# Patient Record
Sex: FEMALE | Race: WHITE | NOT HISPANIC OR LATINO | Employment: UNEMPLOYED | URBAN - METROPOLITAN AREA
[De-identification: names, ages, dates, MRNs, and addresses within clinical notes are randomized per-mention and may not be internally consistent; named-entity substitution may affect disease eponyms.]

---

## 2018-06-19 PROCEDURE — 99284 EMERGENCY DEPT VISIT MOD MDM: CPT

## 2018-06-20 ENCOUNTER — HOSPITAL ENCOUNTER (EMERGENCY)
Facility: MEDICAL CENTER | Age: 43
End: 2018-06-20
Attending: EMERGENCY MEDICINE
Payer: MEDICAID

## 2018-06-20 VITALS
SYSTOLIC BLOOD PRESSURE: 155 MMHG | HEIGHT: 64 IN | OXYGEN SATURATION: 97 % | RESPIRATION RATE: 16 BRPM | HEART RATE: 90 BPM | DIASTOLIC BLOOD PRESSURE: 81 MMHG | BODY MASS INDEX: 47.72 KG/M2 | TEMPERATURE: 98.3 F | WEIGHT: 279.54 LBS

## 2018-06-20 DIAGNOSIS — G47.00 INSOMNIA, UNSPECIFIED TYPE: ICD-10-CM

## 2018-06-20 DIAGNOSIS — F41.9 ANXIETY: ICD-10-CM

## 2018-06-20 RX ORDER — LORAZEPAM 2 MG/1
6 TABLET ORAL
Qty: 9 TAB | Refills: 0 | Status: SHIPPED | OUTPATIENT
Start: 2018-06-20 | End: 2018-06-23

## 2018-06-20 ASSESSMENT — PAIN SCALES - GENERAL: PAINLEVEL_OUTOF10: 0

## 2018-06-20 NOTE — ED TRIAGE NOTES
"Triage Notes    Pt BIB [facility] for anxiety and insomnia, pt was taking 6mg Ativan QHS states it was working on Saturday she was switched to Diazepam 5mg QHS and has not slept since. Pt denies any pain just wants to be able to relax and go to sleep.     .  Chief Complaint   Patient presents with   • Anxiety   • Insomnia     .BP (!) 168/88   Pulse 95   Temp 36.8 °C (98.3 °F)   Resp 18   Ht 1.626 m (5' 4\")   Wt (!) 126.8 kg (279 lb 8.7 oz)   SpO2 96%   BMI 47.98 kg/m²     "

## 2018-06-20 NOTE — ED NOTES
"D/c instructions and rx reviewed with pt. Pt verbalized understanding. Pt in nad. Pt continues to request additional controlled substance rxs prior to d/c. Pt requesting additional \"ativan, codeine, Seroquel, trazodone. Anything to help me sleep\". Pt informed the rx for ativan was the best we could do out of the er. Pt remains anxious, but otherwise in nad at time of d/c.   "

## 2018-06-20 NOTE — ED PROVIDER NOTES
"ED Provider Note    ER PROVIDER NOTE      CHIEF COMPLAINT  Chief Complaint   Patient presents with   • Anxiety   • Insomnia       HPI  Nevin Braxton is a 42 y.o. female who presents to the emergency department complaining of anxiety and difficulty sleeping.  Patient reports he was stable on a dose of lorazepam for many months, recently he was switched to diazepam and has been unable to sleep and more anxious in the last 4 days.  She denies any seizures, palpitations, chest pain nausea vomiting.  States she has an appointment next week but has been unable to get him prior to that    REVIEW OF SYSTEMS  Pertinent positives include anxiety difficulty sleeping. Pertinent negatives include no chest pain. See HPI for details. All other systems reviewed and are negative.    PAST MEDICAL HISTORY   has a past medical history of Psychiatric disorder.    SURGICAL HISTORY  patient denies any surgical history    FAMILY HISTORY  History reviewed. No pertinent family history.    SOCIAL HISTORY  Social History     Social History   • Marital status: Single     Spouse name: N/A   • Number of children: N/A   • Years of education: N/A     Social History Main Topics   • Smoking status: Never Smoker   • Smokeless tobacco: Never Used   • Alcohol use No   • Drug use: No   • Sexual activity: Not on file     Other Topics Concern   • Not on file     Social History Narrative   • No narrative on file      History   Drug Use No       CURRENT MEDICATIONS  Home Medications     Reviewed by Unique Vasquez R.N. (Registered Nurse) on 06/20/18 at 0053  Med List Status: Partial   Medication Last Dose Status        Patient Heber Taking any Medications                       ALLERGIES  No Known Allergies    PHYSICAL EXAM  VITAL SIGNS: BP (!) 168/88   Pulse 95   Temp 36.8 °C (98.3 °F)   Resp 18   Ht 1.626 m (5' 4\")   Wt (!) 126.8 kg (279 lb 8.7 oz)   SpO2 96%   BMI 47.98 kg/m²   Pulse ox interpretation: I interpret this pulse ox as " normal.    Constitutional: Alert in no apparent distress.  HENT: No signs of trauma, Bilateral external ears normal, Nose normal.   Eyes: Pupils are equal and reactive, Conjunctiva normal, Non-icteric.   Neck: Normal range of motion, No tenderness, Supple, No stridor.   Lymphatic: No lymphadenopathy noted.   Cardiovascular: Regular rate and rhythm, no murmurs.   Thorax & Lungs: Normal breath sounds, No respiratory distress, No wheezing, No chest tenderness.   Abdomen: Bowel sounds normal, Soft, No tenderness, No masses, No pulsatile masses. No peritoneal signs.  Skin: Warm, Dry, No erythema, No rash.    Neurologic: Alert , Normal motor function, Normal sensory function, No focal deficits noted.   Psychiatric: Anxious judgment normal, Mood normal.     DIAGNOSTIC STUDIES / PROCEDURES        COURSE & MEDICAL DECISION MAKING  Nursing notes, VS, PMSFHx reviewed in chart.    1:31 AM Patient seen and examined at bedside.   Decision Making:  This is a 42 y.o. female presented with anxiety and difficulty sleeping.  I will give her a short course of her Lorazepam as this was working for her.  I informed her that we are unable to provide long-term prescriptions or refills in the emergency department and she does have a follow-up appointment set.  I also informed her that these medications are not appropriate long-term solution for either anxiety or insomnia she understands this and actually has an appointment with psychiatry to further address this.  However at this time I do think preventing her from withdrawal in helping alleviate her symptoms is indicated    I reviewed prescription monitoring program for patient's narcotic use before prescribing a scheduled drug.The patient will not drink alcohol nor drive with prescribed medications. The patient will return for new or worsening symptoms and is stable at the time of discharge.    The patient is referred to a primary physician for blood pressure management, diabetic  screening, and for all other preventative health concerns.        DISPOSITION:  Patient will be discharged home in stable condition.    FOLLOW UP:  Your Physician  Varies      At your appointment      OUTPATIENT MEDICATIONS:  New Prescriptions    LORAZEPAM (ATIVAN) 2 MG TABLET    Take 3 Tabs by mouth every evening as needed for Anxiety for up to 3 days.         FINAL IMPRESSION  1. Anxiety    2. Insomnia, unspecified type         The note accurately reflects work and decisions made by me.  Brandyn Shabazz  6/20/2018  1:43 AM

## 2018-06-20 NOTE — DISCHARGE INSTRUCTIONS
The medication you are currently taking is not a good long-term solution for insomnia.  It is very important for you to follow-up with her psychiatrist to find an appropriate long-term solution    Insomnia  Insomnia means you have trouble falling or staying asleep. It affects about one person in three at different times and is usually related to stress from work, school, or personal relations. Insomnia is also a sign of depression or anxiety. Other medical problems that cause insomnia include conditions that cause pain, night leg cramps, coughing, shortness of breath, urinary problems, and fevers. Sleep apnea is an abnormal breathing pattern at night that can cause insomnia and loud snoring. Certain medications and excess intake of caffeine drinks (coffee, tea, sharona) can also interfere with normal sleep.  Treatment for insomnia depends on the cause. Besides specific medical treatment, the following measures can help you relax and get better sleep. Get regular exercise every day, at least several hours before bed time. Try to get to bed at the same time every night. Take a hot bath before retiring to help you relax. Do not stay in bed if you are unable to sleep. During the daytime avoid staying in bed to watch television, eat, or read. Reduce unwanted noise and light in your room. Keep your room at a comfortable temperature.  Avoid alcohol as it causes one to sleep less soundly, may cause you to awaken during the night, and can leave you feeling groggy the next day. Using a mild sedative prescribed or suggested by your caregiver may be needed, but the daily use of sleeping pills is not recommended. Anti-depressant medicines can improve sleep in people with depression. Please call your doctor for follow up care to better understand the cause and proper treatment of your insomnia.  Document Released: 01/25/2006 Document Revised: 03/11/2013 Document Reviewed: 12/18/2006  Exit[employer]® Patient Information ©2014 [facility].

## 2019-04-08 ENCOUNTER — TELEPHONE (OUTPATIENT)
Dept: SCHEDULING | Facility: IMAGING CENTER | Age: 44
End: 2019-04-08

## 2019-06-05 ENCOUNTER — OFFICE VISIT (OUTPATIENT)
Dept: INTERNAL MEDICINE | Facility: MEDICAL CENTER | Age: 44
End: 2019-06-05
Payer: MEDICAID

## 2019-06-05 VITALS
BODY MASS INDEX: 53.92 KG/M2 | HEIGHT: 62 IN | DIASTOLIC BLOOD PRESSURE: 80 MMHG | OXYGEN SATURATION: 96 % | HEART RATE: 102 BPM | TEMPERATURE: 96.9 F | SYSTOLIC BLOOD PRESSURE: 110 MMHG | WEIGHT: 293 LBS

## 2019-06-05 DIAGNOSIS — I10 ESSENTIAL HYPERTENSION: ICD-10-CM

## 2019-06-05 DIAGNOSIS — E03.8 OTHER SPECIFIED HYPOTHYROIDISM: ICD-10-CM

## 2019-06-05 DIAGNOSIS — J30.2 SEASONAL ALLERGIC RHINITIS, UNSPECIFIED TRIGGER: ICD-10-CM

## 2019-06-05 DIAGNOSIS — F31.70 BIPOLAR AFFECTIVE DISORDER IN REMISSION (HCC): ICD-10-CM

## 2019-06-05 DIAGNOSIS — Z00.00 HEALTH CARE MAINTENANCE: ICD-10-CM

## 2019-06-05 PROBLEM — J30.9 ALLERGIC RHINITIS: Status: ACTIVE | Noted: 2019-06-05

## 2019-06-05 PROBLEM — E11.9 TYPE 2 DIABETES MELLITUS (HCC): Status: ACTIVE | Noted: 2019-06-05

## 2019-06-05 PROCEDURE — 99204 OFFICE O/P NEW MOD 45 MIN: CPT | Mod: GC | Performed by: INTERNAL MEDICINE

## 2019-06-05 RX ORDER — LORATADINE 10 MG/1
10 TABLET ORAL DAILY
Refills: 1 | COMMUNITY
Start: 2019-04-16 | End: 2019-06-05 | Stop reason: SDUPTHER

## 2019-06-05 RX ORDER — METOPROLOL TARTRATE 50 MG/1
50 TABLET, FILM COATED ORAL DAILY
Qty: 60 TAB | Refills: 2 | Status: SHIPPED | OUTPATIENT
Start: 2019-06-05 | End: 2021-05-13 | Stop reason: SDUPTHER

## 2019-06-05 RX ORDER — ATORVASTATIN CALCIUM 20 MG/1
20 TABLET, FILM COATED ORAL DAILY
Qty: 60 TAB | Refills: 1 | Status: SHIPPED | OUTPATIENT
Start: 2019-06-05 | End: 2021-10-05 | Stop reason: SDUPTHER

## 2019-06-05 RX ORDER — ATORVASTATIN CALCIUM 20 MG/1
20 TABLET, FILM COATED ORAL DAILY
Refills: 2 | COMMUNITY
Start: 2019-04-16 | End: 2019-06-05 | Stop reason: SDUPTHER

## 2019-06-05 RX ORDER — QUETIAPINE FUMARATE 200 MG/1
200 TABLET, FILM COATED ORAL
Refills: 0 | COMMUNITY
Start: 2019-04-16 | End: 2021-03-26

## 2019-06-05 RX ORDER — LISINOPRIL 20 MG/1
20 TABLET ORAL DAILY
Refills: 1 | COMMUNITY
Start: 2019-04-16 | End: 2019-06-05 | Stop reason: SDUPTHER

## 2019-06-05 RX ORDER — LEVOTHYROXINE SODIUM 0.05 MG/1
50 TABLET ORAL
Refills: 3 | COMMUNITY
Start: 2019-04-16 | End: 2019-06-05 | Stop reason: SDUPTHER

## 2019-06-05 RX ORDER — IBUPROFEN 600 MG/1
600 TABLET ORAL EVERY 8 HOURS PRN
COMMUNITY
End: 2019-06-05 | Stop reason: SDUPTHER

## 2019-06-05 RX ORDER — GLYBURIDE-METFORMIN HYDROCHLORIDE 2.5; 5 MG/1; MG/1
1 TABLET ORAL 2 TIMES DAILY WITH MEALS
Refills: 3 | COMMUNITY
Start: 2019-04-16 | End: 2019-06-05 | Stop reason: SDUPTHER

## 2019-06-05 RX ORDER — MONTELUKAST SODIUM 10 MG/1
10 TABLET ORAL DAILY
Refills: 1 | COMMUNITY
Start: 2019-04-16 | End: 2019-06-05 | Stop reason: SDUPTHER

## 2019-06-05 RX ORDER — METHOCARBAMOL 500 MG/1
500 TABLET, FILM COATED ORAL 2 TIMES DAILY
COMMUNITY
End: 2019-06-05 | Stop reason: SDUPTHER

## 2019-06-05 RX ORDER — LANCETS 30 GAUGE
EACH MISCELLANEOUS
COMMUNITY
End: 2019-06-05

## 2019-06-05 RX ORDER — LORATADINE 10 MG/1
10 TABLET ORAL DAILY
Qty: 30 TAB | Refills: 1 | Status: SHIPPED | OUTPATIENT
Start: 2019-06-05 | End: 2021-03-26 | Stop reason: SDUPTHER

## 2019-06-05 RX ORDER — METOPROLOL TARTRATE 50 MG/1
50 TABLET, FILM COATED ORAL DAILY
Refills: 2 | COMMUNITY
Start: 2019-04-16 | End: 2019-06-05 | Stop reason: SDUPTHER

## 2019-06-05 RX ORDER — IBUPROFEN 600 MG/1
600 TABLET ORAL EVERY 8 HOURS PRN
Qty: 60 TAB | Refills: 1 | Status: SHIPPED | OUTPATIENT
Start: 2019-06-05 | End: 2021-03-26

## 2019-06-05 RX ORDER — LANCETS 30 GAUGE
EACH MISCELLANEOUS
Qty: 100 EACH | Refills: 1 | Status: SHIPPED | OUTPATIENT
Start: 2019-06-05 | End: 2021-10-05 | Stop reason: SDUPTHER

## 2019-06-05 RX ORDER — LISINOPRIL 20 MG/1
20 TABLET ORAL DAILY
Qty: 60 TAB | Refills: 1 | Status: SHIPPED | OUTPATIENT
Start: 2019-06-05 | End: 2021-06-11 | Stop reason: SDUPTHER

## 2019-06-05 RX ORDER — GLYBURIDE-METFORMIN HYDROCHLORIDE 2.5; 5 MG/1; MG/1
1 TABLET ORAL 2 TIMES DAILY WITH MEALS
Qty: 120 TAB | Refills: 1 | Status: SHIPPED | OUTPATIENT
Start: 2019-06-05 | End: 2021-03-26

## 2019-06-05 RX ORDER — LEVOTHYROXINE SODIUM 0.05 MG/1
50 TABLET ORAL
Qty: 60 TAB | Refills: 1 | Status: SHIPPED | OUTPATIENT
Start: 2019-06-05 | End: 2021-03-26 | Stop reason: SDUPTHER

## 2019-06-05 RX ORDER — METHOCARBAMOL 500 MG/1
500 TABLET, FILM COATED ORAL
Qty: 30 TAB | Refills: 1 | Status: SHIPPED | OUTPATIENT
Start: 2019-06-05 | End: 2021-03-26

## 2019-06-05 RX ORDER — MONTELUKAST SODIUM 10 MG/1
10 TABLET ORAL DAILY
Qty: 30 TAB | Refills: 1 | Status: SHIPPED | OUTPATIENT
Start: 2019-06-05 | End: 2021-05-05 | Stop reason: SDUPTHER

## 2019-06-05 RX ORDER — HYDROXYZINE PAMOATE 50 MG/1
100 CAPSULE ORAL 2 TIMES DAILY
COMMUNITY
End: 2021-03-26

## 2019-06-05 ASSESSMENT — PATIENT HEALTH QUESTIONNAIRE - PHQ9: CLINICAL INTERPRETATION OF PHQ2 SCORE: 0

## 2019-06-06 NOTE — PROGRESS NOTES
New Patient to Establish    Reason to establish: New patient to establish    CC: Establish care, medication refills    HPI: Patient is a 43-year-old female with past medical history hypothyroidism, type 2 diabetes, allergic rhinitis, hypertension and bipolar disorder who is here today to establish care.  She is requesting refill of all her medications.  She feels well and denies any complaints at this time.    Hypothyroidism: Stable.  Takes levothyroxine 50 mcg daily.    Type 2 diabetes: Takes glyburide-metformin 2.5-500 mg twice daily.  She is compliant with her medications.  Denies any side effects.  Her last A1c from 3/2019 was 7.2.  She denies any hypoglycemic episodes.  Denies symptoms of peripheral neuropathy.  She sees an ophthalmologist every year.    Allergic rhinitis: Takes montelukast 10 mg daily and Claritin daily.  Symptoms are well controlled with these medications.    Hypertension: Takes lisinopril 20 mg daily and metoprolol 50 mg daily.    Bipolar disorder/anxiety: Follows up with psychiatry Dr. De Los Santos.  Is currently on Seroquel 20 mg at bedtime and hydroxyzine 100 mg twice daily.    Patient Active Problem List    Diagnosis Date Noted   • Type 2 diabetes mellitus (HCC) 06/05/2019   • Bipolar affective disorder in remission ([facility]) 06/05/2019   • Other specified hypothyroidism 06/05/2019   • Allergic rhinitis 06/05/2019   • Essential hypertension 06/05/2019       Past Medical History:   Diagnosis Date   • Anxiety    • Diabetes (HCC)    • Hypertension    • Psychiatric disorder        Current Outpatient Prescriptions   Medication Sig Dispense Refill   • QUEtiapine (SEROQUEL) 200 MG Tab Take 200 mg by mouth every bedtime.  0   • hydrOXYzine pamoate (VISTARIL) 50 MG Cap Take 100 mg by mouth 2 Times a Day.     • glucose blood (ONE TOUCH ULTRA TEST) strip 1 Each by Other route 2 Times a Day.     • montelukast (SINGULAIR) 10 MG Tab Take 1 Tab by mouth every day. 30 Tab 1   • levothyroxine (SYNTHROID) 50 MCG Tab  Take 1 Tab by mouth Every morning on an empty stomach. 60 Tab 1   • metoprolol (LOPRESSOR) 50 MG Tab Take 1 Tab by mouth every day. 60 Tab 2   • loratadine (CLARITIN) 10 MG Tab Take 1 Tab by mouth every day. 30 Tab 1   • lisinopril (PRINIVIL) 20 MG Tab Take 1 Tab by mouth every day. 60 Tab 1   • atorvastatin (LIPITOR) 20 MG Tab Take 1 Tab by mouth every day. 60 Tab 1   • glyBURIDE-metFORMIN (GLUCOVANCE) 2.5-500 MG Tab Take 1 Tab by mouth 2 times a day, with meals. 120 Tab 1   • ibuprofen (MOTRIN) 600 MG Tab Take 1 Tab by mouth every 8 hours as needed. 60 Tab 1   • methocarbamol (ROBAXIN) 500 MG Tab Take 1 Tab by mouth 1 time daily as needed. 30 Tab 1   • glucose blood strip 1 Strip by Other route as needed. 100 Strip 1   • Lancets Lancets order: Lancets for insurance preference meter. Sig: use daily and prn ssx high or low sugar. #100 RF x 3 100 Each 1     No current facility-administered medications for this visit.        Allergies as of 06/05/2019   • (No Known Allergies)       Social History     Social History   • Marital status: Single     Spouse name: N/A   • Number of children: N/A   • Years of education: N/A     Occupational History   • Not on file.     Social History Main Topics   • Smoking status: Never Smoker   • Smokeless tobacco: Never Used   • Alcohol use Yes      Comment: Only socially. Very rarely   • Drug use: No   • Sexual activity: Not on file     Other Topics Concern   • Not on file     Social History Narrative   • No narrative on file       Family History   Problem Relation Age of Onset   • Heart Disease Mother    • Alcohol/Drug Mother    • Hypertension Mother    • Hypertension Father    • Psychiatry Sister         Bipolar disorder       History reviewed. No pertinent surgical history.    ROS: As per HPI. Additional pertinent systems as noted below.  Constitutional: Negative for chills and fever.   HENT: Negative for ear pain and sore throat.    Eyes: Negative for discharge and redness.  "  Respiratory: Negative for cough, hemoptysis, wheezing and stridor.    Cardiovascular: Negative for chest pain, palpitations and leg swelling.   Gastrointestinal: Negative for abdominal pain, constipation, diarrhea, heartburn, nausea and vomiting.   Genitourinary: Negative for dysuria, flank pain and hematuria.   Musculoskeletal: Negative for falls and myalgias.   Skin: Negative for itching and rash.   Neurological: Negative for dizziness, seizures, loss of consciousness and headaches.   Endo/Heme/Allergies: Negative for polydipsia. Does not bruise/bleed easily.   Psychiatric/Behavioral: Negative for substance abuse and suicidal ideas.       /80 (BP Location: Left arm, Patient Position: Sitting, BP Cuff Size: Large adult)   Pulse (!) 102   Temp 36.1 °C (96.9 °F) (Temporal)   Ht 1.575 m (5' 2\")   Wt (!) 136.2 kg (300 lb 4 oz)   SpO2 96%   BMI 54.92 kg/m²     Physical Exam  General: Morbidly obese, alert and oriented, No apparent distress.    Eyes: Pupils equal and reactive. No scleral icterus.    Throat: Clear no erythema or exudates noted.    Neck: Supple. No lymphadenopathy noted. Thyroid not enlarged.    Lungs: Clear to auscultation and percussion bilaterally.    Cardiovascular: Regular rate and rhythm. No murmurs, rubs or gallops.    Abdomen: Soft,obese, No rebound or guarding noted.    Extremities: No clubbing, cyanosis, edema.    Skin: Clear. No rash or suspicious skin lesions noted.      Note: I have reviewed all pertinent labs and diagnostic tests associated with this visit with specific comments listed under the assessment and plan below    Assessment and Plan    1. Essential hypertension  - Well controlled  - Continue lisinopril 20 mg daily and metoprolol 50 mg daily  - Advised regular exercise and healthy diet    2. Bipolar affective disorder in remission (HCC)  - Stable  - On quetiapine 200 mg at bedtime  - Follows with psychiatrist Dr. De Los Santos  - Will obtain records    3. Other specified " hypothyroidism  - Labs from 03/2019 show FT4 within normal limits  - Continue levothyroxine 50 mcg daily    4. Type 2 diabetes mellitus  On glyburide-metformin 2.5-500 BID  - A1c 03/2019 7.2  - No symptoms of peripheral neuropathy, monofilament exam next visit  - Next visit with ophthalmologist in July  - Normal album/creat ratio 10/2018  - Advised healthy diet, regular exercise and weight loss  - Continue current medications      5. Health care maintenance  - Pap smear, states that she had one 2 months back, will obtain records  - Mammogram ordered  - MA-SCREENING MAMMO BILAT W/TOMOSYNTHESIS W/CAD; Future        Followup: Return in about 6 months (around 12/5/2019).    Signed by: Danielle Kelley M.D.

## 2019-06-25 ENCOUNTER — PATIENT MESSAGE (OUTPATIENT)
Dept: INTERNAL MEDICINE | Facility: MEDICAL CENTER | Age: 44
End: 2019-06-25

## 2019-06-26 NOTE — TELEPHONE ENCOUNTER
The lancets were already prescribed on 06/05. Do I need to reorder ? Please check with pharmacy if there was a problem with that.

## 2021-03-26 ENCOUNTER — TELEMEDICINE (OUTPATIENT)
Dept: MEDICAL GROUP | Facility: MEDICAL CENTER | Age: 46
End: 2021-03-26
Attending: INTERNAL MEDICINE
Payer: MEDICAID

## 2021-03-26 DIAGNOSIS — J30.2 SEASONAL ALLERGIC RHINITIS, UNSPECIFIED TRIGGER: ICD-10-CM

## 2021-03-26 DIAGNOSIS — E03.9 HYPOTHYROIDISM, UNSPECIFIED TYPE: ICD-10-CM

## 2021-03-26 DIAGNOSIS — F40.00 AGORAPHOBIA: ICD-10-CM

## 2021-03-26 DIAGNOSIS — E11.9 TYPE 2 DIABETES MELLITUS WITHOUT COMPLICATION, WITHOUT LONG-TERM CURRENT USE OF INSULIN (HCC): ICD-10-CM

## 2021-03-26 DIAGNOSIS — F31.70 BIPOLAR AFFECTIVE DISORDER IN REMISSION (HCC): ICD-10-CM

## 2021-03-26 DIAGNOSIS — E78.49 OTHER HYPERLIPIDEMIA: ICD-10-CM

## 2021-03-26 DIAGNOSIS — I10 ESSENTIAL HYPERTENSION: ICD-10-CM

## 2021-03-26 DIAGNOSIS — G47.00 INSOMNIA, UNSPECIFIED TYPE: ICD-10-CM

## 2021-03-26 DIAGNOSIS — F41.0 GENERALIZED ANXIETY DISORDER WITH PANIC ATTACKS: ICD-10-CM

## 2021-03-26 DIAGNOSIS — F41.1 GENERALIZED ANXIETY DISORDER WITH PANIC ATTACKS: ICD-10-CM

## 2021-03-26 PROCEDURE — 99204 OFFICE O/P NEW MOD 45 MIN: CPT | Performed by: INTERNAL MEDICINE

## 2021-03-26 RX ORDER — LEVOTHYROXINE SODIUM 0.05 MG/1
50 TABLET ORAL
Qty: 30 TABLET | Refills: 5 | Status: SHIPPED | OUTPATIENT
Start: 2021-03-26 | End: 2021-08-23

## 2021-03-26 RX ORDER — METFORMIN HYDROCHLORIDE 500 MG/1
2 TABLET, EXTENDED RELEASE ORAL 2 TIMES DAILY
COMMUNITY
Start: 2021-03-22 | End: 2021-10-05 | Stop reason: SDUPTHER

## 2021-03-26 RX ORDER — FLUOXETINE HYDROCHLORIDE 20 MG/1
CAPSULE ORAL
COMMUNITY
Start: 2021-03-22 | End: 2023-10-18

## 2021-03-26 RX ORDER — DIAZEPAM 5 MG/1
TABLET ORAL
COMMUNITY
Start: 2021-02-16 | End: 2021-07-15

## 2021-03-26 RX ORDER — LORATADINE 10 MG/1
10 TABLET ORAL DAILY
Qty: 30 TABLET | Refills: 11 | Status: SHIPPED | OUTPATIENT
Start: 2021-03-26 | End: 2021-10-05 | Stop reason: SDUPTHER

## 2021-03-26 RX ORDER — ZOLPIDEM TARTRATE 12.5 MG/1
TABLET, FILM COATED, EXTENDED RELEASE ORAL
COMMUNITY
Start: 2021-03-16

## 2021-03-26 NOTE — ASSESSMENT & PLAN NOTE
She was diagnosed with hypothyroidism a few years ago.  She continues on levothyroxine 50 mcg daily and reports being stable on this dose.

## 2021-03-26 NOTE — ASSESSMENT & PLAN NOTE
She has a history of severe allergies for which she takes Singulair and Claritin.  She reports good control of her symptoms on this regimen.

## 2021-03-26 NOTE — ASSESSMENT & PLAN NOTE
She is currently working with her psychiatrist on treatment.  States that she was previously on Seroquel and then was switched to Vraylar.  The Vraylar was causing her blood sugars to go up so now they are discussing an alternative.  She has an appointment with her psychiatrist next week.

## 2021-03-26 NOTE — ASSESSMENT & PLAN NOTE
She reports struggling with severe anxiety as well as agoraphobia.  It is difficult for her to leave the house and she typically uses [facility] access to get to her medical appointments.  She prefers to do visits virtually if possible.  She is due for renewal of her RTC paperwork.  She currently sees psychiatry through [facility].  She is taking diazepam 5 mg twice daily as needed, fluoxetine 20 mg daily, and they are working on an alternative to Vraylar to try to help with her bipolar disorder as well.

## 2021-03-26 NOTE — ASSESSMENT & PLAN NOTE
"She reports being diagnosed with diabetes a few years ago.  She cannot remember what her last A1c was and states that it was \"a little bit high\" about 6 months ago.  She has been checking blood sugars in the morning fasting and reports numbers in the 180s to 200s.  She is currently taking extended release Metformin 1000 mg twice daily.  She has also been on glipizide in the past but no other medications for diabetes.  "

## 2021-03-26 NOTE — ASSESSMENT & PLAN NOTE
Currently taking atorvastatin 20 mg daily.  She is not sure what her last labs showed.  She had them through [facility] which we do not have access to.

## 2021-03-26 NOTE — ASSESSMENT & PLAN NOTE
"She reports hypertension for the past several years.  She does have a home blood pressure monitor and reports \"my numbers have not been too bad\" but she is not sure what her readings are.  She is taking lisinopril 20 mg daily.    "

## 2021-03-26 NOTE — PROGRESS NOTES
"Virtual Visit: New Patient   This visit was conducted via Zoom using secure and encrypted videoconferencing technology. The patient was in a private location in the state of [place].    The patient's identity was confirmed and verbal consent was obtained for this virtual visit.    Subjective:     CC: diabetes management, [facility]      Nevin Braxton is a 45 y.o. female presenting to establish care and to discuss the evaluation and management of:      Type 2 diabetes mellitus (HCC)  She reports being diagnosed with diabetes a few years ago.  She cannot remember what her last A1c was and states that it was \"a little bit high\" about 6 months ago.  She has been checking blood sugars in the morning fasting and reports numbers in the 180s to 200s.  She is currently taking extended release Metformin 1000 mg twice daily.  She has also been on glipizide in the past but no other medications for diabetes.    Other specified hypothyroidism  She was diagnosed with hypothyroidism a few years ago.  She continues on levothyroxine 50 mcg daily and reports being stable on this dose.      Essential hypertension  She reports hypertension for the past several years.  She does have a home blood pressure monitor and reports \"my numbers have not been too bad\" but she is not sure what her readings are.  She is taking lisinopril 20 mg daily.      Other hyperlipidemia  Currently taking atorvastatin 20 mg daily.  She is not sure what her last labs showed.  She had them through [facility] which we do not have access to.    Generalized anxiety disorder with panic attacks  She reports struggling with severe anxiety as well as agoraphobia.  It is difficult for her to leave the house and she typically uses RTC access to get to her medical appointments.  She prefers to do visits virtually if possible.  She is due for renewal of her RT paperwork.  She currently sees psychiatry through [facility].  She is taking diazepam 5 mg " twice daily as needed, fluoxetine 20 mg daily, and they are working on an alternative to Vraylar to try to help with her bipolar disorder as well.    Bipolar affective disorder in remission (HCC)  She is currently working with her psychiatrist on treatment.  States that she was previously on Seroquel and then was switched to Vraylar.  The Vraylar was causing her blood sugars to go up so now they are discussing an alternative.  She has an appointment with her psychiatrist next week.    Allergic rhinitis  She has a history of severe allergies for which she takes Singulair and Claritin.  She reports good control of her symptoms on this regimen.         ROS  See HPI  Constitutional: Negative for fever, chills and malaise/fatigue.   HENT: Negative for congestion.    Eyes: Negative for pain.   Respiratory: Negative for cough and shortness of breath.    Cardiovascular: Negative for leg swelling.   Gastrointestinal: Negative for nausea, vomiting, abdominal pain and diarrhea.   Genitourinary: Negative for dysuria and hematuria.   Skin: Negative for rash.   Neurological: Negative for dizziness, focal weakness and headaches.   Endo/Heme/Allergies: Does not bruise/bleed easily.   Psychiatric/Behavioral: Negative for depression.  The patient is not nervous/anxious.      No Known Allergies    Current medicines (including changes today)  Current Outpatient Medications   Medication Sig Dispense Refill   • loratadine (CLARITIN) 10 MG Tab Take 1 tablet by mouth every day. 30 tablet 11   • levothyroxine (SYNTHROID) 50 MCG Tab Take 1 tablet by mouth Every morning on an empty stomach. 30 tablet 5   • montelukast (SINGULAIR) 10 MG Tab Take 1 Tab by mouth every day. 30 Tab 1   • metoprolol (LOPRESSOR) 50 MG Tab Take 1 Tab by mouth every day. 60 Tab 2   • lisinopril (PRINIVIL) 20 MG Tab Take 1 Tab by mouth every day. 60 Tab 1   • metFORMIN ER (GLUCOPHAGE XR) 500 MG TABLET SR 24 HR Take 2 Tablets by mouth 2 Times a Day.     • FLUoxetine  "(PROZAC) 20 MG Cap      • zolpidem (AMBIEN CR) 12.5 MG CR tablet      • diazePAM (VALIUM) 5 MG Tab      • glucose blood strip 1 Strip by Other route every day. 100 Strip 1   • glucose blood (ONE TOUCH ULTRA TEST) strip 1 Each by Other route 2 Times a Day.     • atorvastatin (LIPITOR) 20 MG Tab Take 1 Tab by mouth every day. 60 Tab 1   • Lancets Lancets order: Lancets for insurance preference meter. Sig: use daily and prn ssx high or low sugar. #100 RF x 3 100 Each 1     No current facility-administered medications for this visit.       She  has a past medical history of Anxiety, Bipolar 1 disorder (HCC), Depression, Diabetes (HCC), Hyperlipidemia, Hypertension, and Thyroid disease.  She  has no past surgical history on file.      Family History   Problem Relation Age of Onset   • Heart Disease Mother    • Alcohol/Drug Mother    • Hypertension Mother    • Hyperlipidemia Mother    • Hypertension Father    • Hyperlipidemia Father    • Psychiatric Illness Sister         Bipolar disorder   • Hypertension Brother    • Hyperlipidemia Brother    • Cancer Neg Hx    • Diabetes Neg Hx    • Stroke Neg Hx      Family Status   Relation Name Status   • Mo     • Fa  Alive   • Sis  (Not Specified)   • Bro  (Not Specified)   • Neg Hx  (Not Specified)       Patient Active Problem List    Diagnosis Date Noted   • Generalized anxiety disorder with panic attacks 2021   • Agoraphobia 2021   • Other hyperlipidemia 2021   • Insomnia 2021   • Type 2 diabetes mellitus (HCC) 2019   • Bipolar affective disorder in remission (HCC) 2019   • Other specified hypothyroidism 2019   • Allergic rhinitis 2019   • Essential hypertension 2019          Objective:   Height: 5'2\"  Weight: 300 lb  RR: 12    Physical Exam:  Constitutional: Alert, no distress, well-groomed.  Skin: No rashes in visible areas.  Eye: Round. Conjunctiva clear, lids normal. No icterus.   ENMT: Lips pink without lesions, " good dentition, moist mucous membranes. Phonation normal.  Neck: No masses, no thyromegaly. Moves freely without pain.  Respiratory: Unlabored respiratory effort, no cough or audible wheeze  Psych: Alert and oriented x3, normal affect and mood.       Assessment and Plan:   The following treatment plan was discussed:     1. Seasonal allergic rhinitis, unspecified trigger  Stable, well-controlled with current medications  -Singulair 10 mg daily  - loratadine (CLARITIN) 10 MG Tab; Take 1 tablet by mouth every day.  Dispense: 30 tablet; Refill: 11    2. Hypothyroidism, unspecified type  Stable, well-controlled with current medications.  Will obtain updated labs.  - levothyroxine (SYNTHROID) 50 MCG Tab; Take 1 tablet by mouth Every morning on an empty stomach.  Dispense: 30 tablet; Refill: 5  - TSH WITH REFLEX TO FT4; Future    3. Type 2 diabetes mellitus without complication, without long-term current use of insulin (HCC)  Per patient blood sugars have been a little bit elevated.  We will obtain labs as below and adjust her medications accordingly.  -Continue Metformin extended release 1000 mg twice daily  - HEMOGLOBIN A1C; Future  - MICROALBUMIN CREAT RATIO URINE; Future  - Lipid Profile; Future  - Comp Metabolic Panel; Future    4. Generalized anxiety disorder with panic attacks  Severe per patient and prevents her from using public transportation.  Her RTC paperwork has been completed and will be faxed over today.  She will continue her current medications and management through psychiatry.    5. Agoraphobia  As discussed above    6. Other hyperlipidemia  We will continue current meds and obtain updated labs  -Lipid panel ordered today  -Atorvastatin 20 mg daily    7. Essential hypertension  Controlled per patient.  We will continue current meds  -Lisinopril 20 mg daily    8. Bipolar affective disorder in remission (HCC)  Partially controlled.  She will continue close follow-up with her psychiatrist for medication  management        Follow-up: Return in about 3 months (around 6/26/2021), or if symptoms worsen or fail to improve, for diabetes.

## 2021-05-05 DIAGNOSIS — J30.2 SEASONAL ALLERGIC RHINITIS, UNSPECIFIED TRIGGER: ICD-10-CM

## 2021-05-05 RX ORDER — MONTELUKAST SODIUM 10 MG/1
10 TABLET ORAL DAILY
Qty: 30 TABLET | Refills: 11 | Status: SHIPPED | OUTPATIENT
Start: 2021-05-05 | End: 2021-10-05 | Stop reason: SDUPTHER

## 2021-05-13 DIAGNOSIS — I10 ESSENTIAL HYPERTENSION: ICD-10-CM

## 2021-05-13 RX ORDER — METOPROLOL TARTRATE 50 MG/1
50 TABLET, FILM COATED ORAL DAILY
Qty: 30 TABLET | Refills: 5 | Status: SHIPPED | OUTPATIENT
Start: 2021-05-13 | End: 2021-10-05 | Stop reason: SDUPTHER

## 2021-05-14 PROBLEM — H26.9 CATARACT: Status: ACTIVE | Noted: 2021-05-14

## 2021-05-14 PROBLEM — E11.3399 MODERATE NONPROLIFERATIVE DIABETIC RETINOPATHY (HCC): Status: ACTIVE | Noted: 2021-05-14

## 2021-06-11 DIAGNOSIS — I10 ESSENTIAL HYPERTENSION: ICD-10-CM

## 2021-06-11 RX ORDER — LISINOPRIL 20 MG/1
20 TABLET ORAL DAILY
Qty: 30 TABLET | Refills: 11 | Status: SHIPPED | OUTPATIENT
Start: 2021-06-11 | End: 2021-10-05 | Stop reason: SDUPTHER

## 2021-06-28 ENCOUNTER — HOSPITAL ENCOUNTER (OUTPATIENT)
Dept: LAB | Facility: MEDICAL CENTER | Age: 46
End: 2021-06-28
Attending: INTERNAL MEDICINE
Payer: MEDICAID

## 2021-06-28 DIAGNOSIS — E11.9 TYPE 2 DIABETES MELLITUS WITHOUT COMPLICATION, WITHOUT LONG-TERM CURRENT USE OF INSULIN (HCC): ICD-10-CM

## 2021-06-28 DIAGNOSIS — E03.9 HYPOTHYROIDISM, UNSPECIFIED TYPE: ICD-10-CM

## 2021-06-28 LAB
ALBUMIN SERPL BCP-MCNC: 3.8 G/DL (ref 3.2–4.9)
ALBUMIN/GLOB SERPL: 1.2 G/DL
ALP SERPL-CCNC: 70 U/L (ref 30–99)
ALT SERPL-CCNC: 10 U/L (ref 2–50)
ANION GAP SERPL CALC-SCNC: 13 MMOL/L (ref 7–16)
AST SERPL-CCNC: 19 U/L (ref 12–45)
BILIRUB SERPL-MCNC: 0.5 MG/DL (ref 0.1–1.5)
BUN SERPL-MCNC: 10 MG/DL (ref 8–22)
CALCIUM SERPL-MCNC: 8.6 MG/DL (ref 8.5–10.5)
CHLORIDE SERPL-SCNC: 98 MMOL/L (ref 96–112)
CHOLEST SERPL-MCNC: 150 MG/DL (ref 100–199)
CO2 SERPL-SCNC: 21 MMOL/L (ref 20–33)
CREAT SERPL-MCNC: 0.52 MG/DL (ref 0.5–1.4)
EST. AVERAGE GLUCOSE BLD GHB EST-MCNC: 246 MG/DL
FASTING STATUS PATIENT QL REPORTED: NORMAL
GLOBULIN SER CALC-MCNC: 3.2 G/DL (ref 1.9–3.5)
GLUCOSE SERPL-MCNC: 303 MG/DL (ref 65–99)
HBA1C MFR BLD: 10.2 % (ref 4–5.6)
HDLC SERPL-MCNC: 48 MG/DL
LDLC SERPL CALC-MCNC: 76 MG/DL
POTASSIUM SERPL-SCNC: 5.2 MMOL/L (ref 3.6–5.5)
PROT SERPL-MCNC: 7 G/DL (ref 6–8.2)
SODIUM SERPL-SCNC: 132 MMOL/L (ref 135–145)
TRIGL SERPL-MCNC: 130 MG/DL (ref 0–149)
TSH SERPL DL<=0.005 MIU/L-ACNC: 2.72 UIU/ML (ref 0.38–5.33)

## 2021-06-28 PROCEDURE — 82043 UR ALBUMIN QUANTITATIVE: CPT

## 2021-06-28 PROCEDURE — 84443 ASSAY THYROID STIM HORMONE: CPT

## 2021-06-28 PROCEDURE — 80053 COMPREHEN METABOLIC PANEL: CPT

## 2021-06-28 PROCEDURE — 80061 LIPID PANEL: CPT

## 2021-06-28 PROCEDURE — 83036 HEMOGLOBIN GLYCOSYLATED A1C: CPT

## 2021-06-28 PROCEDURE — 36415 COLL VENOUS BLD VENIPUNCTURE: CPT

## 2021-06-28 PROCEDURE — 82570 ASSAY OF URINE CREATININE: CPT

## 2021-06-29 LAB
CREAT UR-MCNC: 113.55 MG/DL
MICROALBUMIN UR-MCNC: <1.2 MG/DL
MICROALBUMIN/CREAT UR: NORMAL MG/G (ref 0–30)

## 2021-07-15 ENCOUNTER — TELEMEDICINE (OUTPATIENT)
Dept: MEDICAL GROUP | Facility: MEDICAL CENTER | Age: 46
End: 2021-07-15
Attending: INTERNAL MEDICINE
Payer: MEDICAID

## 2021-07-15 VITALS — RESPIRATION RATE: 16 BRPM | WEIGHT: 293 LBS | BODY MASS INDEX: 53.92 KG/M2 | HEIGHT: 62 IN

## 2021-07-15 DIAGNOSIS — K52.1 DIARRHEA DUE TO DRUG: ICD-10-CM

## 2021-07-15 DIAGNOSIS — E11.9 TYPE 2 DIABETES MELLITUS WITHOUT COMPLICATION, WITHOUT LONG-TERM CURRENT USE OF INSULIN (HCC): ICD-10-CM

## 2021-07-15 DIAGNOSIS — J30.2 SEASONAL ALLERGIC RHINITIS, UNSPECIFIED TRIGGER: ICD-10-CM

## 2021-07-15 PROCEDURE — 99214 OFFICE O/P EST MOD 30 MIN: CPT | Mod: CR | Performed by: INTERNAL MEDICINE

## 2021-07-15 RX ORDER — LOPERAMIDE HYDROCHLORIDE 2 MG/1
2 CAPSULE ORAL 4 TIMES DAILY PRN
Qty: 30 CAPSULE | Refills: 3 | Status: SHIPPED | OUTPATIENT
Start: 2021-07-15 | End: 2021-10-05 | Stop reason: SDUPTHER

## 2021-07-15 RX ORDER — SEMAGLUTIDE 1.34 MG/ML
INJECTION, SOLUTION SUBCUTANEOUS
Qty: 1.5 ML | Refills: 3 | Status: SHIPPED | OUTPATIENT
Start: 2021-07-15 | End: 2021-08-16

## 2021-07-15 RX ORDER — BENZONATATE 100 MG/1
100 CAPSULE ORAL 3 TIMES DAILY PRN
Qty: 60 CAPSULE | Refills: 2 | Status: SHIPPED | OUTPATIENT
Start: 2021-07-15 | End: 2021-10-06 | Stop reason: SDUPTHER

## 2021-07-15 RX ORDER — LORAZEPAM 1 MG/1
TABLET ORAL
COMMUNITY
Start: 2021-06-18

## 2021-07-15 NOTE — ASSESSMENT & PLAN NOTE
She presents today for follow-up diabetes.  She has been taking her Metformin  mg 2 tablets twice daily but her last A1c was 10.7.  States she has not been eating a lot of sugary foods or sweets.  She does not drink soda outside of diet Pepsi.  Has tried to minimize carbs.  No significant weight change recently but reports increased stress.

## 2021-07-15 NOTE — ASSESSMENT & PLAN NOTE
Post nasal drip has been causing cough lately.  She is using an old script for tessalon that has been helpful and she is requesting a refill.

## 2021-07-15 NOTE — ASSESSMENT & PLAN NOTE
She gets diarrhea related to her Metformin.  States that she is used to it but she would like a medication to take as needed when it becomes bothersome.

## 2021-07-15 NOTE — PROGRESS NOTES
Virtual Visit: Established Patient   This visit was conducted via Zoom using secure and encrypted videoconferencing technology. The patient was in a private location in the state of [place].    The patient's identity was confirmed and verbal consent was obtained for this virtual visit.    Subjective:   CC:   Chief Complaint   Patient presents with   • Follow-Up diabetes, labs       Nevin Braxton is a 45 y.o. female presenting for evaluation and management of:      Type 2 diabetes mellitus (HCC)  She presents today for follow-up diabetes.  She has been taking her Metformin  mg 2 tablets twice daily but her last A1c was 10.7.  States she has not been eating a lot of sugary foods or sweets.  She does not drink soda outside of diet Pepsi.  Has tried to minimize carbs.  No significant weight change recently but reports increased stress.    Diarrhea due to drug  She gets diarrhea related to her Metformin.  States that she is used to it but she would like a medication to take as needed when it becomes bothersome.    Allergic rhinitis  Post nasal drip has been causing cough lately.  She is using an old script for tessalon that has been helpful and she is requesting a refill.         ROS   Denies any recent fevers or chills. No nausea or vomiting. No chest pains or shortness of breath.     No Known Allergies    Current medicines (including changes today)  Current Outpatient Medications   Medication Sig Dispense Refill   • loperamide (IMODIUM) 2 MG Cap Take 1 capsule by mouth 4 times a day as needed for Diarrhea. 30 capsule 3   • benzonatate (TESSALON) 100 MG Cap Take 1 capsule by mouth 3 times a day as needed for Cough. 60 capsule 2   • Semaglutide,0.25 or 0.5MG/DOS, (OZEMPIC, 0.25 OR 0.5 MG/DOSE,) 2 MG/1.5ML Solution Pen-injector Administer 0.25mg SQ weekly for 4 weeks then increase to 0.5 mg SQ weekly 1.5 mL 3   • LORazepam (ATIVAN) 1 MG Tab      • lisinopril (PRINIVIL) 20 MG Tab Take 1 tablet by mouth every day.  30 tablet 11   • metoprolol tartrate (LOPRESSOR) 50 MG Tab Take 1 tablet by mouth every day. 30 tablet 5   • montelukast (SINGULAIR) 10 MG Tab Take 1 tablet by mouth every day. 30 tablet 11   • metFORMIN ER (GLUCOPHAGE XR) 500 MG TABLET SR 24 HR Take 2 Tablets by mouth 2 Times a Day.     • FLUoxetine (PROZAC) 20 MG Cap      • zolpidem (AMBIEN CR) 12.5 MG CR tablet      • loratadine (CLARITIN) 10 MG Tab Take 1 tablet by mouth every day. 30 tablet 11   • levothyroxine (SYNTHROID) 50 MCG Tab Take 1 tablet by mouth Every morning on an empty stomach. 30 tablet 5   • glucose blood strip 1 Strip by Other route every day. 100 Strip 1   • glucose blood (ONE TOUCH ULTRA TEST) strip 1 Each by Other route 2 Times a Day.     • atorvastatin (LIPITOR) 20 MG Tab Take 1 Tab by mouth every day. 60 Tab 1   • Lancets Lancets order: Lancets for insurance preference meter. Sig: use daily and prn ssx high or low sugar. #100 RF x 3 100 Each 1     No current facility-administered medications for this visit.       Patient Active Problem List    Diagnosis Date Noted   • Diarrhea due to drug 07/15/2021   • Moderate nonproliferative diabetic retinopathy (HCC) 05/14/2021   • Cataract 05/14/2021   • Generalized anxiety disorder with panic attacks 03/26/2021   • Agoraphobia 03/26/2021   • Other hyperlipidemia 03/26/2021   • Insomnia 03/26/2021   • Type 2 diabetes mellitus (HCC) 06/05/2019   • Bipolar affective disorder in remission (HCC) 06/05/2019   • Other specified hypothyroidism 06/05/2019   • Allergic rhinitis 06/05/2019   • Essential hypertension 06/05/2019       Family History   Problem Relation Age of Onset   • Heart Disease Mother    • Alcohol/Drug Mother    • Hypertension Mother    • Hyperlipidemia Mother    • Hypertension Father    • Hyperlipidemia Father    • Psychiatric Illness Sister         Bipolar disorder   • Hypertension Brother    • Hyperlipidemia Brother    • Cancer Neg Hx    • Diabetes Neg Hx    • Stroke Neg Hx        She  has  "a past medical history of Anxiety, Bipolar 1 disorder ([facility]), Depression, Diabetes ([facility]), Hyperlipidemia, Hypertension, and Thyroid disease.  She  has no past surgical history on file.       Objective:   Resp 16   Ht 1.575 m (5' 2\")   Wt (!) 136 kg (300 lb)   BMI 54.87 kg/m²     Physical Exam:  Constitutional: Alert, no distress, well-groomed.  Skin: No rashes in visible areas.  Eye: Round. Conjunctiva clear, lids normal. No icterus.   ENMT: Lips pink without lesions, good dentition, moist mucous membranes. Phonation normal.  Neck: No masses, no thyromegaly. Moves freely without pain.  Respiratory: Unlabored respiratory effort, no cough or audible wheeze  Psych: Alert and oriented x3, tearful, depressed mood      Assessment and Plan:   The following treatment plan was discussed:     1. Type 2 diabetes mellitus without complication, without long-term current use of insulin ([facility])  Discussed side effects, will start on Ozempic as below.  She will continue Metformin.  We will follow-up in office in about 2 months for repeat A1c.  Reviewed diabetic diet.  -Continue metfomin XR 1000 mg BID  - Semaglutide,0.25 or 0.5MG/DOS, (OZEMPIC, 0.25 OR 0.5 MG/DOSE,) 2 MG/1.5ML Solution Pen-injector; Administer 0.25mg SQ weekly for 4 weeks then increase to 0.5 mg SQ weekly  Dispense: 1.5 mL; Refill: 3  -f/u 2 months, repeat A1c    2. Seasonal allergic rhinitis, unspecified trigger  Refilled tessalon to help with cough related to PND  - benzonatate (TESSALON) 100 MG Cap; Take 1 capsule by mouth 3 times a day as needed for Cough.  Dispense: 60 capsule; Refill: 2    3. Diarrhea due to drug  Trial of PRN immodium for metformin related diarrhea  - loperamide (IMODIUM) 2 MG Cap; Take 1 capsule by mouth 4 times a day as needed for Diarrhea.  Dispense: 30 capsule; Refill: 3          Follow-up: Return in about 2 months (around 9/15/2021), or if symptoms worsen or fail to improve, for diabetes.           "

## 2021-08-15 DIAGNOSIS — E11.9 TYPE 2 DIABETES MELLITUS WITHOUT COMPLICATION, WITHOUT LONG-TERM CURRENT USE OF INSULIN (HCC): ICD-10-CM

## 2021-08-16 ENCOUNTER — PATIENT MESSAGE (OUTPATIENT)
Dept: MEDICAL GROUP | Facility: MEDICAL CENTER | Age: 46
End: 2021-08-16

## 2021-08-16 DIAGNOSIS — E11.9 TYPE 2 DIABETES MELLITUS WITHOUT COMPLICATION, WITHOUT LONG-TERM CURRENT USE OF INSULIN (HCC): ICD-10-CM

## 2021-08-17 RX ORDER — SEMAGLUTIDE 1.34 MG/ML
0.5 INJECTION, SOLUTION SUBCUTANEOUS
Qty: 1.5 ML | Refills: 3 | Status: SHIPPED | OUTPATIENT
Start: 2021-08-17 | End: 2021-10-05 | Stop reason: SDUPTHER

## 2021-08-17 RX ORDER — SEMAGLUTIDE 1.34 MG/ML
INJECTION, SOLUTION SUBCUTANEOUS
Qty: 1.5 ML | Refills: 0 | OUTPATIENT
Start: 2021-08-17

## 2021-08-23 DIAGNOSIS — E03.9 HYPOTHYROIDISM, UNSPECIFIED TYPE: ICD-10-CM

## 2021-08-30 RX ORDER — LEVOTHYROXINE SODIUM 0.05 MG/1
TABLET ORAL
Qty: 30 TABLET | Refills: 5 | Status: SHIPPED | OUTPATIENT
Start: 2021-08-30 | End: 2021-09-13 | Stop reason: SDUPTHER

## 2021-09-13 DIAGNOSIS — E03.9 HYPOTHYROIDISM, UNSPECIFIED TYPE: ICD-10-CM

## 2021-09-14 RX ORDER — LEVOTHYROXINE SODIUM 0.05 MG/1
50 TABLET ORAL
Qty: 30 TABLET | Refills: 5 | Status: SHIPPED | OUTPATIENT
Start: 2021-09-14 | End: 2021-10-05 | Stop reason: SDUPTHER

## 2021-09-22 ENCOUNTER — TELEPHONE (OUTPATIENT)
Dept: MEDICAL GROUP | Facility: MEDICAL CENTER | Age: 46
End: 2021-09-22

## 2021-09-23 NOTE — TELEPHONE ENCOUNTER
MEDICATION PRIOR AUTHORIZATION NEEDED:    1. Name of Medication: ozempic    2. Requested By (Name of Pharmacy): [facility]     3. Is insurance on file current? yes    4. What is the name & phone number of the 3rd party payor? [employer]     [employer]ia cover my meds

## 2021-10-01 ENCOUNTER — PATIENT MESSAGE (OUTPATIENT)
Dept: MEDICAL GROUP | Facility: MEDICAL CENTER | Age: 46
End: 2021-10-01

## 2021-10-01 DIAGNOSIS — K52.1 DIARRHEA DUE TO DRUG: ICD-10-CM

## 2021-10-01 DIAGNOSIS — E11.9 TYPE 2 DIABETES MELLITUS WITHOUT COMPLICATION, WITHOUT LONG-TERM CURRENT USE OF INSULIN (HCC): ICD-10-CM

## 2021-10-01 DIAGNOSIS — I10 ESSENTIAL HYPERTENSION: ICD-10-CM

## 2021-10-01 DIAGNOSIS — E03.9 HYPOTHYROIDISM, UNSPECIFIED TYPE: ICD-10-CM

## 2021-10-01 DIAGNOSIS — J30.2 SEASONAL ALLERGIC RHINITIS, UNSPECIFIED TRIGGER: ICD-10-CM

## 2021-10-05 ENCOUNTER — PATIENT MESSAGE (OUTPATIENT)
Dept: MEDICAL GROUP | Facility: MEDICAL CENTER | Age: 46
End: 2021-10-05

## 2021-10-05 DIAGNOSIS — E11.9 TYPE 2 DIABETES MELLITUS WITHOUT COMPLICATION, WITHOUT LONG-TERM CURRENT USE OF INSULIN (HCC): ICD-10-CM

## 2021-10-05 RX ORDER — METFORMIN HYDROCHLORIDE 500 MG/1
1000 TABLET, EXTENDED RELEASE ORAL 2 TIMES DAILY
Qty: 120 TABLET | Refills: 5 | Status: SHIPPED | OUTPATIENT
Start: 2021-10-05 | End: 2021-10-20

## 2021-10-05 RX ORDER — LANCETS 30 GAUGE
EACH MISCELLANEOUS
Qty: 100 EACH | Refills: 3 | Status: SHIPPED | OUTPATIENT
Start: 2021-10-05

## 2021-10-05 RX ORDER — LEVOTHYROXINE SODIUM 0.05 MG/1
50 TABLET ORAL
Qty: 30 TABLET | Refills: 5 | Status: SHIPPED | OUTPATIENT
Start: 2021-10-05 | End: 2022-04-05 | Stop reason: SDUPTHER

## 2021-10-05 RX ORDER — METOPROLOL TARTRATE 50 MG/1
50 TABLET, FILM COATED ORAL DAILY
Qty: 30 TABLET | Refills: 5 | Status: SHIPPED | OUTPATIENT
Start: 2021-10-05 | End: 2022-05-03 | Stop reason: SDUPTHER

## 2021-10-05 RX ORDER — LISINOPRIL 20 MG/1
20 TABLET ORAL DAILY
Qty: 30 TABLET | Refills: 11 | Status: SHIPPED | OUTPATIENT
Start: 2021-10-05 | End: 2022-10-19 | Stop reason: SDUPTHER

## 2021-10-05 RX ORDER — LOPERAMIDE HYDROCHLORIDE 2 MG/1
2 CAPSULE ORAL 4 TIMES DAILY PRN
Qty: 30 CAPSULE | Refills: 3 | Status: SHIPPED | OUTPATIENT
Start: 2021-10-05 | End: 2021-10-20

## 2021-10-05 RX ORDER — SEMAGLUTIDE 1.34 MG/ML
0.5 INJECTION, SOLUTION SUBCUTANEOUS
Qty: 1.5 ML | Refills: 5 | Status: SHIPPED | OUTPATIENT
Start: 2021-10-05 | End: 2021-10-20 | Stop reason: DRUGHIGH

## 2021-10-05 RX ORDER — LORATADINE 10 MG/1
10 TABLET ORAL DAILY
Qty: 30 TABLET | Refills: 11 | Status: SHIPPED | OUTPATIENT
Start: 2021-10-05 | End: 2022-04-05 | Stop reason: SDUPTHER

## 2021-10-05 RX ORDER — MONTELUKAST SODIUM 10 MG/1
10 TABLET ORAL DAILY
Qty: 30 TABLET | Refills: 11 | Status: SHIPPED | OUTPATIENT
Start: 2021-10-05 | End: 2022-10-19 | Stop reason: SDUPTHER

## 2021-10-05 RX ORDER — ATORVASTATIN CALCIUM 20 MG/1
20 TABLET, FILM COATED ORAL DAILY
Qty: 30 TABLET | Refills: 5 | Status: SHIPPED | OUTPATIENT
Start: 2021-10-05 | End: 2022-05-03 | Stop reason: SDUPTHER

## 2021-10-05 NOTE — PATIENT COMMUNICATION
1. Caller Name: [facility]                         Call Back Number:        How would the patient prefer to be contacted with a response: Phone call OK to leave a detailed message       [facility] is asking for an rx for a new meter as they do not have one on file.

## 2021-10-06 DIAGNOSIS — J30.2 SEASONAL ALLERGIC RHINITIS, UNSPECIFIED TRIGGER: ICD-10-CM

## 2021-10-06 RX ORDER — BENZONATATE 100 MG/1
100 CAPSULE ORAL 3 TIMES DAILY PRN
Qty: 60 CAPSULE | Refills: 2 | Status: SHIPPED | OUTPATIENT
Start: 2021-10-06 | End: 2022-02-08 | Stop reason: SDUPTHER

## 2021-10-06 NOTE — TELEPHONE ENCOUNTER
Received request via: Pharmacy    Was the patient seen in the last year in this department? Yes    Does the patient have an active prescription (recently filled or refills available) for medication(s) requested? No     Pt last seen 3/26/21 via telemedicine.     Future Appointments       Provider Department Center    10/20/2021 11:00 AM Blaire Guillory M.D. [facility]

## 2021-10-20 ENCOUNTER — OFFICE VISIT (OUTPATIENT)
Dept: MEDICAL GROUP | Facility: MEDICAL CENTER | Age: 46
End: 2021-10-20
Attending: INTERNAL MEDICINE
Payer: MEDICAID

## 2021-10-20 VITALS
WEIGHT: 293 LBS | DIASTOLIC BLOOD PRESSURE: 90 MMHG | BODY MASS INDEX: 53.92 KG/M2 | HEIGHT: 62 IN | HEART RATE: 96 BPM | SYSTOLIC BLOOD PRESSURE: 140 MMHG | RESPIRATION RATE: 16 BRPM | OXYGEN SATURATION: 94 % | TEMPERATURE: 97.3 F

## 2021-10-20 DIAGNOSIS — E11.9 TYPE 2 DIABETES MELLITUS WITHOUT COMPLICATION, WITHOUT LONG-TERM CURRENT USE OF INSULIN (HCC): ICD-10-CM

## 2021-10-20 DIAGNOSIS — Z23 NEED FOR VACCINATION: ICD-10-CM

## 2021-10-20 DIAGNOSIS — R11.0 NAUSEA: ICD-10-CM

## 2021-10-20 LAB
HBA1C MFR BLD: 8.5 % (ref 0–5.6)
INT CON NEG: NEGATIVE
INT CON POS: POSITIVE

## 2021-10-20 PROCEDURE — 83036 HEMOGLOBIN GLYCOSYLATED A1C: CPT | Performed by: INTERNAL MEDICINE

## 2021-10-20 PROCEDURE — 99213 OFFICE O/P EST LOW 20 MIN: CPT | Mod: 25 | Performed by: INTERNAL MEDICINE

## 2021-10-20 PROCEDURE — 99214 OFFICE O/P EST MOD 30 MIN: CPT | Performed by: INTERNAL MEDICINE

## 2021-10-20 PROCEDURE — 90686 IIV4 VACC NO PRSV 0.5 ML IM: CPT

## 2021-10-20 RX ORDER — SEMAGLUTIDE 1.34 MG/ML
1 INJECTION, SOLUTION SUBCUTANEOUS
Qty: 3 ML | Refills: 5 | Status: SHIPPED | OUTPATIENT
Start: 2021-10-20 | End: 2022-01-09 | Stop reason: SDUPTHER

## 2021-10-20 RX ORDER — ONDANSETRON 4 MG/1
4 TABLET, ORALLY DISINTEGRATING ORAL EVERY 6 HOURS PRN
Qty: 10 TABLET | Refills: 3 | Status: SHIPPED | OUTPATIENT
Start: 2021-10-20 | End: 2022-02-07 | Stop reason: SDUPTHER

## 2021-10-20 RX ORDER — EMPAGLIFLOZIN 25 MG/1
25 TABLET, FILM COATED ORAL DAILY
Qty: 30 TABLET | Refills: 5 | Status: SHIPPED | OUTPATIENT
Start: 2021-10-20 | End: 2022-04-13 | Stop reason: SDUPTHER

## 2021-10-20 RX ORDER — LITHIUM CARBONATE 150 MG/1
CAPSULE ORAL
COMMUNITY
Start: 2021-10-13 | End: 2023-10-18

## 2021-10-20 RX ORDER — METFORMIN HYDROCHLORIDE 500 MG/1
TABLET, EXTENDED RELEASE ORAL
COMMUNITY
Start: 2021-06-15 | End: 2022-02-23

## 2021-10-20 NOTE — ASSESSMENT & PLAN NOTE
She presents for follow-up diabetes.  Her A1c in clinic today is 8.5.  She has been taking the Ozempic 0.5 mg for the past month.  She reports some nausea but she is not sure if this is also from her lithium.  She has been on Metformin both regular release and extended release for over 4 years.  She has always had severe GI side effects and daily diarrhea and cramping as a result.  She would like to try stopping the Metformin as she has needed to take Imodium to combat the side effect and she is uncomfortable a lot of the time.

## 2021-10-21 PROBLEM — K52.1 DIARRHEA DUE TO DRUG: Status: RESOLVED | Noted: 2021-07-15 | Resolved: 2021-10-21

## 2021-10-21 NOTE — PROGRESS NOTES
Subjective:   Nevin Braxton is a 46 y.o. female here today for f/u diabetes    Type 2 diabetes mellitus (HCC)  She presents for follow-up diabetes.  Her A1c in clinic today is 8.5.  She has been taking the Ozempic 0.5 mg for the past month.  She reports some nausea but she is not sure if this is also from her lithium.  She has been on Metformin both regular release and extended release for over 4 years.  She has always had severe GI side effects and daily diarrhea and cramping as a result.  She would like to try stopping the Metformin as she has needed to take Imodium to combat the side effect and she is uncomfortable a lot of the time.        Current medicines (including changes today)  Current Outpatient Medications   Medication Sig Dispense Refill   • lithium carbonate (ESKALITH) 150 MG Cap      • metFORMIN ER (GLUCOPHAGE XR) 500 MG TABLET SR 24 HR METFORMIN HCL  MG XR24H-TAB     • Semaglutide, 1 MG/DOSE, (OZEMPIC, 1 MG/DOSE,) 2 MG/1.5ML Solution Pen-injector Inject 1 mg under the skin every 7 days. 3 mL 5   • ondansetron (ZOFRAN ODT) 4 MG TABLET DISPERSIBLE Take 1 Tablet by mouth every 6 hours as needed for Nausea. 10 Tablet 3   • Empagliflozin (JARDIANCE) 25 MG Tab Take 25 mg by mouth every day. 30 Tablet 5   • Blood Glucose Meter Kit Use to test blood sugar daily 1 Kit 0   • benzonatate (TESSALON) 100 MG Cap Take 1 Capsule by mouth 3 times a day as needed for Cough. 60 Capsule 2   • atorvastatin (LIPITOR) 20 MG Tab Take 1 Tablet by mouth every day. 30 Tablet 5   • glucose blood strip 1 Strip by Other route every day. 100 Strip 3   • Lancets Lancets order: Lancets for insurance preference meter. Sig: use daily and prn ssx high or low sugar. #100 RF x 3 100 Each 3   • levothyroxine (SYNTHROID) 50 MCG Tab Take 1 Tablet by mouth every morning on an empty stomach. ON EMPTY STOMACH 30 Tablet 5   • lisinopril (PRINIVIL) 20 MG Tab Take 1 Tablet by mouth every day. 30 Tablet 11   • loratadine (CLARITIN) 10 MG  Tab Take 1 Tablet by mouth every day. 30 Tablet 11   • metoprolol tartrate (LOPRESSOR) 50 MG Tab Take 1 Tablet by mouth every day. 30 Tablet 5   • montelukast (SINGULAIR) 10 MG Tab Take 1 Tablet by mouth every day. 30 Tablet 11   • LORazepam (ATIVAN) 1 MG Tab      • FLUoxetine (PROZAC) 20 MG Cap      • zolpidem (AMBIEN CR) 12.5 MG CR tablet        No current facility-administered medications for this visit.     She  has a past medical history of Anxiety, Bipolar 1 disorder ([facility]), Depression, Diabetes ([facility]), Hyperlipidemia, Hypertension, and Thyroid disease.    ROS   Denies chest pain, shortness of breath  As above in HPI     Objective:     Vitals:    10/20/21 1049   BP: 140/90   Pulse: 96   Resp: 16   Temp: 36.3 °C (97.3 °F)   SpO2: 94%     Body mass index is 53.58 kg/m².   Physical Exam:  Constitutional: Alert, no distress.  Skin: Warm, dry, good turgor, no rashes in visible areas.  Eye: Equal, round and reactive, conjunctiva clear, lids normal.  Respiratory: Unlabored respiratory effort, lungs clear to auscultation, no wheezes, no ronchi.  Cardiovascular: Regular rate and rhythm, no murmurs appreciated, no lower extremity edema  Psych: Alert and oriented x3, normal affect and mood.      Results and Imaging:   POC A1c in clinic today was 8.5    Assessment and Plan:   The following treatment plan was discussed    1. Type 2 diabetes mellitus without complication, without long-term current use of insulin ([facility])  Discussed need for improvement in her blood sugar control.  She is interested in increasing her Ozempic.  She would like to stop her metformin given her significant side effects with this even with the extended release Metformin.  Because of this, we will start her on Jardiance and have her stop the Metformin with follow-up in 3 months.  - POCT Hemoglobin A1C  - Semaglutide, 1 MG/DOSE, (OZEMPIC, 1 MG/DOSE,) 2 MG/1.5ML Solution Pen-injector; Inject 1 mg under the skin every 7 days.  Dispense: 3 mL; Refill: 5  -  Empagliflozin (JARDIANCE) 25 MG Tab; Take 25 mg by mouth every day.  Dispense: 30 Tablet; Refill: 5    2. Nausea  - ondansetron (ZOFRAN ODT) 4 MG TABLET DISPERSIBLE; Take 1 Tablet by mouth every 6 hours as needed for Nausea.  Dispense: 10 Tablet; Refill: 3    3. Need for vaccination  - INFLUENZA VACCINE QUAD INJ (PF)        Followup: Return in about 3 months (around 1/20/2022), or if symptoms worsen or fail to improve, for diabetes.

## 2022-01-07 DIAGNOSIS — J30.2 SEASONAL ALLERGIC RHINITIS, UNSPECIFIED TRIGGER: ICD-10-CM

## 2022-01-07 DIAGNOSIS — E11.9 TYPE 2 DIABETES MELLITUS WITHOUT COMPLICATION, WITHOUT LONG-TERM CURRENT USE OF INSULIN (HCC): ICD-10-CM

## 2022-01-07 RX ORDER — SEMAGLUTIDE 1.34 MG/ML
1 INJECTION, SOLUTION SUBCUTANEOUS
Qty: 3 ML | Refills: 5 | OUTPATIENT
Start: 2022-01-07

## 2022-01-07 RX ORDER — BENZONATATE 100 MG/1
100 CAPSULE ORAL 3 TIMES DAILY PRN
Qty: 60 CAPSULE | Refills: 2 | Status: CANCELLED | OUTPATIENT
Start: 2022-01-07

## 2022-01-09 DIAGNOSIS — E11.9 TYPE 2 DIABETES MELLITUS WITHOUT COMPLICATION, WITHOUT LONG-TERM CURRENT USE OF INSULIN (HCC): ICD-10-CM

## 2022-01-10 RX ORDER — SEMAGLUTIDE 1.34 MG/ML
1 INJECTION, SOLUTION SUBCUTANEOUS
Qty: 3 ML | Refills: 5 | Status: SHIPPED | OUTPATIENT
Start: 2022-01-10 | End: 2022-06-29 | Stop reason: SDUPTHER

## 2022-02-07 DIAGNOSIS — R11.0 NAUSEA: ICD-10-CM

## 2022-02-07 RX ORDER — ONDANSETRON 4 MG/1
4 TABLET, ORALLY DISINTEGRATING ORAL EVERY 6 HOURS PRN
Qty: 10 TABLET | Refills: 3 | Status: SHIPPED | OUTPATIENT
Start: 2022-02-07 | End: 2022-05-03 | Stop reason: SDUPTHER

## 2022-02-08 DIAGNOSIS — J30.2 SEASONAL ALLERGIC RHINITIS, UNSPECIFIED TRIGGER: ICD-10-CM

## 2022-02-08 NOTE — TELEPHONE ENCOUNTER
Received request via: Pharmacy    Was the patient seen in the last year in this department? Yes    Does the patient have an active prescription (recently filled or refills available) for medication(s) requested? No     Last visit 10/20/21

## 2022-02-09 RX ORDER — BENZONATATE 100 MG/1
100 CAPSULE ORAL 3 TIMES DAILY PRN
Qty: 60 CAPSULE | Refills: 2 | Status: SHIPPED | OUTPATIENT
Start: 2022-02-09 | End: 2022-05-03 | Stop reason: SDUPTHER

## 2022-02-23 ENCOUNTER — TELEMEDICINE (OUTPATIENT)
Dept: MEDICAL GROUP | Facility: MEDICAL CENTER | Age: 47
End: 2022-02-23
Attending: INTERNAL MEDICINE
Payer: MEDICAID

## 2022-02-23 VITALS — BODY MASS INDEX: 53.92 KG/M2 | WEIGHT: 293 LBS | HEIGHT: 62 IN | RESPIRATION RATE: 16 BRPM

## 2022-02-23 DIAGNOSIS — M62.838 MUSCLE SPASM: ICD-10-CM

## 2022-02-23 DIAGNOSIS — E11.9 TYPE 2 DIABETES MELLITUS WITHOUT COMPLICATION, WITHOUT LONG-TERM CURRENT USE OF INSULIN (HCC): ICD-10-CM

## 2022-02-23 DIAGNOSIS — A08.4 VIRAL GASTROENTERITIS: ICD-10-CM

## 2022-02-23 PROCEDURE — 99214 OFFICE O/P EST MOD 30 MIN: CPT | Mod: GT | Performed by: INTERNAL MEDICINE

## 2022-02-23 RX ORDER — CYCLOBENZAPRINE HCL 5 MG
5 TABLET ORAL NIGHTLY PRN
Qty: 30 TABLET | Refills: 0 | Status: SHIPPED | OUTPATIENT
Start: 2022-02-23 | End: 2022-03-24 | Stop reason: SDUPTHER

## 2022-02-23 NOTE — ASSESSMENT & PLAN NOTE
She presents today for follow-up diabetes.  She reports that since increasing her Ozempic at last visit, her blood sugars have been significantly better.  She reports yesterday morning she was at 117 and she has not seen a blood sugar over 200 in the morning since we increased the dose.  She is tolerating it well.  She is feeling much better off the Metformin.

## 2022-02-23 NOTE — ASSESSMENT & PLAN NOTE
"Reports feeling sick with \"a stomach flu\" for the past week.  Complains of diarrhea, nausea, and vomiting.  At this point she feels like she is getting better.  She has been using Zofran and Imodium as needed with some relief.  Today, she has tolerated breakfast and some water.  She has not had any fevers.  "

## 2022-02-23 NOTE — ASSESSMENT & PLAN NOTE
She reports that at night she gets significant muscle spasms in her legs and calves. She does not find that walking alleviates or exacerbates her symptoms.  She denies overwhelming urge to move her legs.  Reports that she has been staying well-hydrated.

## 2022-02-23 NOTE — PROGRESS NOTES
"Virtual Visit: Established Patient   This visit was conducted via Zoom using secure and encrypted videoconferencing technology.   The patient was in their home in the [facility].    The patient's identity was confirmed and verbal consent was obtained for this virtual visit.     Subjective:   CC:   Chief Complaint   Patient presents with   • Follow-Up       Nevin Braxton is a 46 y.o. female presenting for evaluation and management of:    Type 2 diabetes mellitus (HCC)  She presents today for follow-up diabetes.  She reports that since increasing her Ozempic at last visit, her blood sugars have been significantly better.  She reports yesterday morning she was at 117 and she has not seen a blood sugar over 200 in the morning since we increased the dose.  She is tolerating it well.  She is feeling much better off the Metformin.      Muscle spasm  She reports that at night she gets significant muscle spasms in her legs and calves. She does not find that walking alleviates or exacerbates her symptoms.  She denies overwhelming urge to move her legs.  Reports that she has been staying well-hydrated.    Viral gastroenteritis  Reports feeling sick with \"a stomach flu\" for the past week.  Complains of diarrhea, nausea, and vomiting.  At this point she feels like she is getting better.  She has been using Zofran and Imodium as needed with some relief.  Today, she has tolerated breakfast and some water.  She has not had any fevers.         Current medicines (including changes today)  Current Outpatient Medications   Medication Sig Dispense Refill   • magnesium oxide (MAG-OX) 400 MG Tab tablet Take 1 Tablet by mouth at bedtime. 30 Tablet 1   • cyclobenzaprine (FLEXERIL) 5 mg tablet Take 1 Tablet by mouth at bedtime as needed for Muscle Spasms. 30 Tablet 0   • benzonatate (TESSALON) 100 MG Cap Take 1 Capsule by mouth 3 times a day as needed for Cough. 60 Capsule 2   • ondansetron (ZOFRAN ODT) 4 MG TABLET DISPERSIBLE Take 1 " Tablet by mouth every 6 hours as needed for Nausea. 10 Tablet 3   • Semaglutide, 1 MG/DOSE, (OZEMPIC, 1 MG/DOSE,) 2 MG/1.5ML Solution Pen-injector Inject 1 mg under the skin every 7 days. 3 mL 5   • lithium carbonate (ESKALITH) 150 MG Cap      • Empagliflozin (JARDIANCE) 25 MG Tab Take 25 mg by mouth every day. 30 Tablet 5   • Blood Glucose Meter Kit Use to test blood sugar daily 1 Kit 0   • atorvastatin (LIPITOR) 20 MG Tab Take 1 Tablet by mouth every day. 30 Tablet 5   • glucose blood strip 1 Strip by Other route every day. 100 Strip 3   • Lancets Lancets order: Lancets for insurance preference meter. Sig: use daily and prn ssx high or low sugar. #100 RF x 3 100 Each 3   • levothyroxine (SYNTHROID) 50 MCG Tab Take 1 Tablet by mouth every morning on an empty stomach. ON EMPTY STOMACH 30 Tablet 5   • lisinopril (PRINIVIL) 20 MG Tab Take 1 Tablet by mouth every day. 30 Tablet 11   • loratadine (CLARITIN) 10 MG Tab Take 1 Tablet by mouth every day. 30 Tablet 11   • metoprolol tartrate (LOPRESSOR) 50 MG Tab Take 1 Tablet by mouth every day. 30 Tablet 5   • montelukast (SINGULAIR) 10 MG Tab Take 1 Tablet by mouth every day. 30 Tablet 11   • LORazepam (ATIVAN) 1 MG Tab      • FLUoxetine (PROZAC) 20 MG Cap      • zolpidem (AMBIEN CR) 12.5 MG CR tablet        No current facility-administered medications for this visit.       Patient Active Problem List    Diagnosis Date Noted   • Muscle spasm 02/23/2022   • Viral gastroenteritis 02/23/2022   • Moderate nonproliferative diabetic retinopathy (HCC) 05/14/2021   • Cataract 05/14/2021   • Generalized anxiety disorder with panic attacks 03/26/2021   • Agoraphobia 03/26/2021   • Other hyperlipidemia 03/26/2021   • Insomnia 03/26/2021   • Type 2 diabetes mellitus (HCC) 06/05/2019   • Bipolar affective disorder in remission (HCC) 06/05/2019   • Other specified hypothyroidism 06/05/2019   • Allergic rhinitis 06/05/2019   • Essential hypertension 06/05/2019        Objective:   Resp  "16   Ht 1.575 m (5' 2\")   Wt (!) 133 kg (293 lb)   BMI 53.59 kg/m²     Physical Exam:  Constitutional: Alert, no distress, well-groomed.  Skin: No rashes in visible areas.  Eye: Round. Conjunctiva clear, lids normal. No icterus.   ENMT: Lips pink without lesions, good dentition, moist mucous membranes. Phonation normal.  Neck: No masses, no thyromegaly. Moves freely without pain.  Respiratory: Unlabored respiratory effort, no cough or audible wheeze  Psych: Alert and oriented x3, normal affect and mood.     Assessment and Plan:   The following treatment plan was discussed:     1. Type 2 diabetes mellitus without complication, without long-term current use of insulin (HCC)  Improving per patient home reported glucose.  We discussed obtaining a repeat A1c.  She prefers to come into clinic to have this done.  We will continue current meds.  She would like to wait until this note was over before she comes in.  -Continue Ozempic 2 mg weekly, Jardiance 25 mg daily  -Follow-up in 6 weeks for repeat A1c    2. Muscle spasm  Discussed trial of nocturnal magnesium.  If ineffective then she can try low-dose Flexeril.  We will discuss more in exam and is appropriate at next visit.  - magnesium oxide (MAG-OX) 400 MG Tab tablet; Take 1 Tablet by mouth at bedtime.  Dispense: 30 Tablet; Refill: 1  - cyclobenzaprine (FLEXERIL) 5 mg tablet; Take 1 Tablet by mouth at bedtime as needed for Muscle Spasms.  Dispense: 30 Tablet; Refill: 0    3. Viral gastroenteritis  Seems to be resolving.  Symptoms have been noted and patient will let me know if she starts to worsen.        Follow-up: Return in about 6 weeks (around 4/6/2022), or if symptoms worsen or fail to improve, for diabetes.         "

## 2022-03-24 DIAGNOSIS — M62.838 MUSCLE SPASM: ICD-10-CM

## 2022-03-24 RX ORDER — CYCLOBENZAPRINE HCL 5 MG
5 TABLET ORAL NIGHTLY PRN
Qty: 30 TABLET | Refills: 3 | Status: SHIPPED | OUTPATIENT
Start: 2022-03-24 | End: 2022-07-29 | Stop reason: SDUPTHER

## 2022-04-05 DIAGNOSIS — E03.9 HYPOTHYROIDISM, UNSPECIFIED TYPE: ICD-10-CM

## 2022-04-05 DIAGNOSIS — J30.2 SEASONAL ALLERGIC RHINITIS, UNSPECIFIED TRIGGER: ICD-10-CM

## 2022-04-05 RX ORDER — LEVOTHYROXINE SODIUM 0.05 MG/1
50 TABLET ORAL
Qty: 30 TABLET | Refills: 5 | Status: SHIPPED | OUTPATIENT
Start: 2022-04-05 | End: 2022-09-20 | Stop reason: SDUPTHER

## 2022-04-05 RX ORDER — LORATADINE 10 MG/1
10 TABLET ORAL DAILY
Qty: 30 TABLET | Refills: 5 | Status: SHIPPED | OUTPATIENT
Start: 2022-04-05 | End: 2022-10-19 | Stop reason: SDUPTHER

## 2022-04-13 DIAGNOSIS — E11.9 TYPE 2 DIABETES MELLITUS WITHOUT COMPLICATION, WITHOUT LONG-TERM CURRENT USE OF INSULIN (HCC): ICD-10-CM

## 2022-04-14 RX ORDER — EMPAGLIFLOZIN 25 MG/1
25 TABLET, FILM COATED ORAL DAILY
Qty: 30 TABLET | Refills: 11 | Status: SHIPPED | OUTPATIENT
Start: 2022-04-14 | End: 2023-04-05 | Stop reason: SDUPTHER

## 2022-04-14 NOTE — TELEPHONE ENCOUNTER
Received request via: Pharmacy    Was the patient seen in the last year in this department? Yes    Does the patient have an active prescription (recently filled or refills available) for medication(s) requested? No     Last visit 2/23/22

## 2022-05-03 DIAGNOSIS — R11.0 NAUSEA: ICD-10-CM

## 2022-05-03 DIAGNOSIS — E11.9 TYPE 2 DIABETES MELLITUS WITHOUT COMPLICATION, WITHOUT LONG-TERM CURRENT USE OF INSULIN (HCC): ICD-10-CM

## 2022-05-03 DIAGNOSIS — I10 ESSENTIAL HYPERTENSION: ICD-10-CM

## 2022-05-03 DIAGNOSIS — J30.2 SEASONAL ALLERGIC RHINITIS, UNSPECIFIED TRIGGER: ICD-10-CM

## 2022-05-03 RX ORDER — METOPROLOL TARTRATE 50 MG/1
50 TABLET, FILM COATED ORAL DAILY
Qty: 30 TABLET | Refills: 5 | Status: SHIPPED | OUTPATIENT
Start: 2022-05-03 | End: 2022-10-19 | Stop reason: SDUPTHER

## 2022-05-04 RX ORDER — BENZONATATE 100 MG/1
100 CAPSULE ORAL 3 TIMES DAILY PRN
Qty: 60 CAPSULE | Refills: 0 | Status: SHIPPED | OUTPATIENT
Start: 2022-05-04 | End: 2022-08-11 | Stop reason: SDUPTHER

## 2022-05-04 RX ORDER — ATORVASTATIN CALCIUM 20 MG/1
20 TABLET, FILM COATED ORAL DAILY
Qty: 30 TABLET | Refills: 5 | Status: SHIPPED | OUTPATIENT
Start: 2022-05-04 | End: 2022-11-17 | Stop reason: SDUPTHER

## 2022-05-04 RX ORDER — ONDANSETRON 4 MG/1
4 TABLET, ORALLY DISINTEGRATING ORAL EVERY 6 HOURS PRN
Qty: 10 TABLET | Refills: 3 | Status: SHIPPED | OUTPATIENT
Start: 2022-05-04 | End: 2022-08-31 | Stop reason: SDUPTHER

## 2022-05-31 DIAGNOSIS — J30.2 SEASONAL ALLERGIC RHINITIS, UNSPECIFIED TRIGGER: ICD-10-CM

## 2022-06-01 RX ORDER — BENZONATATE 100 MG/1
100 CAPSULE ORAL 3 TIMES DAILY PRN
Qty: 60 CAPSULE | Refills: 0 | OUTPATIENT
Start: 2022-06-01

## 2022-06-29 DIAGNOSIS — E11.9 TYPE 2 DIABETES MELLITUS WITHOUT COMPLICATION, WITHOUT LONG-TERM CURRENT USE OF INSULIN (HCC): ICD-10-CM

## 2022-06-29 RX ORDER — SEMAGLUTIDE 1.34 MG/ML
1 INJECTION, SOLUTION SUBCUTANEOUS
Qty: 3 ML | Refills: 5 | Status: SHIPPED | OUTPATIENT
Start: 2022-06-29 | End: 2022-08-11 | Stop reason: DRUGHIGH

## 2022-07-29 DIAGNOSIS — M62.838 MUSCLE SPASM: ICD-10-CM

## 2022-07-29 RX ORDER — CYCLOBENZAPRINE HCL 5 MG
5 TABLET ORAL NIGHTLY PRN
Qty: 30 TABLET | Refills: 5 | Status: SHIPPED | OUTPATIENT
Start: 2022-07-29 | End: 2023-01-17 | Stop reason: SDUPTHER

## 2022-08-09 RX ORDER — SEMAGLUTIDE 1.34 MG/ML
INJECTION, SOLUTION SUBCUTANEOUS
COMMUNITY
End: 2022-08-11

## 2022-08-09 RX ORDER — MAGNESIUM OXIDE 400 MG/1
TABLET ORAL
COMMUNITY

## 2022-08-11 ENCOUNTER — OFFICE VISIT (OUTPATIENT)
Dept: MEDICAL GROUP | Facility: MEDICAL CENTER | Age: 47
End: 2022-08-11
Attending: INTERNAL MEDICINE
Payer: MEDICAID

## 2022-08-11 VITALS
OXYGEN SATURATION: 93 % | DIASTOLIC BLOOD PRESSURE: 80 MMHG | BODY MASS INDEX: 52.63 KG/M2 | HEART RATE: 104 BPM | WEIGHT: 286 LBS | HEIGHT: 62 IN | RESPIRATION RATE: 16 BRPM | TEMPERATURE: 97.9 F | SYSTOLIC BLOOD PRESSURE: 140 MMHG

## 2022-08-11 DIAGNOSIS — R05.9 COUGH: ICD-10-CM

## 2022-08-11 DIAGNOSIS — Z23 NEED FOR VACCINATION: ICD-10-CM

## 2022-08-11 DIAGNOSIS — I10 ESSENTIAL HYPERTENSION: ICD-10-CM

## 2022-08-11 DIAGNOSIS — Z12.31 ENCOUNTER FOR SCREENING MAMMOGRAM FOR BREAST CANCER: ICD-10-CM

## 2022-08-11 DIAGNOSIS — E11.9 TYPE 2 DIABETES MELLITUS WITHOUT COMPLICATION, WITHOUT LONG-TERM CURRENT USE OF INSULIN (HCC): ICD-10-CM

## 2022-08-11 DIAGNOSIS — E03.8 OTHER SPECIFIED HYPOTHYROIDISM: ICD-10-CM

## 2022-08-11 DIAGNOSIS — Z79.899 LITHIUM USE: ICD-10-CM

## 2022-08-11 PROBLEM — A08.4 VIRAL GASTROENTERITIS: Status: RESOLVED | Noted: 2022-02-23 | Resolved: 2022-08-11

## 2022-08-11 LAB
HBA1C MFR BLD: 10.6 % (ref 0–5.6)
INT CON NEG: NEGATIVE
INT CON POS: POSITIVE

## 2022-08-11 PROCEDURE — 99214 OFFICE O/P EST MOD 30 MIN: CPT | Performed by: INTERNAL MEDICINE

## 2022-08-11 PROCEDURE — 90715 TDAP VACCINE 7 YRS/> IM: CPT

## 2022-08-11 PROCEDURE — 90732 PPSV23 VACC 2 YRS+ SUBQ/IM: CPT

## 2022-08-11 PROCEDURE — 83036 HEMOGLOBIN GLYCOSYLATED A1C: CPT | Performed by: INTERNAL MEDICINE

## 2022-08-11 PROCEDURE — 99213 OFFICE O/P EST LOW 20 MIN: CPT | Performed by: INTERNAL MEDICINE

## 2022-08-11 RX ORDER — BENZONATATE 100 MG/1
100 CAPSULE ORAL 3 TIMES DAILY PRN
Qty: 60 CAPSULE | Refills: 3 | Status: SHIPPED | OUTPATIENT
Start: 2022-08-11 | End: 2022-11-17 | Stop reason: SDUPTHER

## 2022-08-11 RX ORDER — SEMAGLUTIDE 1.34 MG/ML
INJECTION, SOLUTION SUBCUTANEOUS
COMMUNITY
Start: 2022-07-27 | End: 2022-08-11

## 2022-08-11 RX ORDER — GLUCOSAM/CHON-MSM1/C/MANG/BOSW 500-416.6
TABLET ORAL
COMMUNITY
End: 2023-10-18

## 2022-08-11 RX ORDER — SEMAGLUTIDE 2.68 MG/ML
2 INJECTION, SOLUTION SUBCUTANEOUS
Qty: 3 ML | Refills: 5 | Status: SHIPPED | OUTPATIENT
Start: 2022-08-11 | End: 2022-08-19

## 2022-08-11 NOTE — ASSESSMENT & PLAN NOTE
Per patient, her psychiatrist is requesting we check a lithium level with her next labs.  She was started in January.  She denies any adverse side effects but states she has never had a level checked.

## 2022-08-11 NOTE — ASSESSMENT & PLAN NOTE
Blood pressure is a little high in clinic today at 140/80.  She continues on lisinopril 20 mg daily, metoprolol 50 mg daily

## 2022-08-11 NOTE — ASSESSMENT & PLAN NOTE
She presents for follow-up diabetes.  She has been using her Ozempic 1 mg weekly and Jardiance 25 mg daily.  States she has been following a diabetic diet that is low in carbs for the most part.  A1c is up to 10.6 today from 8.3 previously.  She is not sure how to explain this.  Weight has gone down.  Fasting sugars are around 170 most days.  Of note, has not tolerated metformin in the past due to GI upset.

## 2022-08-11 NOTE — ASSESSMENT & PLAN NOTE
"She reports an intermittent cough which is triggered by \"a tickle in my throat\".  She states that if this starts she cannot get herself to stop coughing and she ends up with posttussive emesis.  She uses Tessalon Perles to help with this and reports good control of symptoms with as needed use.  "

## 2022-08-11 NOTE — PROGRESS NOTES
"Subjective:   Nevin Braxton is a 46 y.o. female here today for f/u DM, chronic issues below    Type 2 diabetes mellitus (HCC)  She presents for follow-up diabetes.  She has been using her Ozempic 1 mg weekly and Jardiance 25 mg daily.  States she has been following a diabetic diet that is low in carbs for the most part.  A1c is up to 10.6 today from 8.3 previously.  She is not sure how to explain this.  Weight has gone down.  Fasting sugars are around 170 most days.  Of note, has not tolerated metformin in the past due to GI upset.    Other specified hypothyroidism  She continues on levothyroxine 50 mcg daily.  She is due for updated labs.    Essential hypertension  Blood pressure is a little high in clinic today at 140/80.  She continues on lisinopril 20 mg daily, metoprolol 50 mg daily    Lithium use  Per patient, her psychiatrist is requesting we check a lithium level with her next labs.  She was started in January.  She denies any adverse side effects but states she has never had a level checked.    Cough  She reports an intermittent cough which is triggered by \"a tickle in my throat\".  She states that if this starts she cannot get herself to stop coughing and she ends up with posttussive emesis.  She uses Tessalon Perles to help with this and reports good control of symptoms with as needed use.       Current medicines (including changes today)  Current Outpatient Medications   Medication Sig Dispense Refill    Semaglutide, 2 MG/DOSE, (OZEMPIC, 2 MG/DOSE,) 8 MG/3ML Solution Pen-injector Inject 2 mg under the skin every 7 days. 3 mL 5    benzonatate (TESSALON) 100 MG Cap Take 1 Capsule by mouth 3 times a day as needed for Cough. 60 Capsule 3    glucose blood strip True Metrix Glucose Test Strip      magnesium oxide (MAG-OX) 400 MG Tab tablet magnesium oxide 400 mg (241.3 mg magnesium) tablet      cyclobenzaprine (FLEXERIL) 5 mg tablet Take 1 Tablet by mouth at bedtime as needed for Muscle Spasms. 30 Tablet 5 "    ondansetron (ZOFRAN ODT) 4 MG TABLET DISPERSIBLE Take 1 Tablet by mouth every 6 hours as needed for Nausea. 10 Tablet 3    atorvastatin (LIPITOR) 20 MG Tab Take 1 Tablet by mouth every day. 30 Tablet 5    metoprolol tartrate (LOPRESSOR) 50 MG Tab Take 1 Tablet by mouth every day. 30 Tablet 5    Empagliflozin (JARDIANCE) 25 MG Tab Take 25 mg by mouth every day. 30 Tablet 11    levothyroxine (SYNTHROID) 50 MCG Tab Take 1 Tablet by mouth every morning on an empty stomach. ON EMPTY STOMACH 30 Tablet 5    loratadine (CLARITIN) 10 MG Tab Take 1 Tablet by mouth every day. 30 Tablet 5    magnesium oxide (MAG-OX) 400 MG Tab tablet Take 1 Tablet by mouth at bedtime. 30 Tablet 1    lithium carbonate (ESKALITH) 150 MG Cap       Blood Glucose Meter Kit Use to test blood sugar daily 1 Kit 0    glucose blood strip 1 Strip by Other route every day. 100 Strip 3    Lancets Lancets order: Lancets for insurance preference meter. Sig: use daily and prn ssx high or low sugar. #100 RF x 3 100 Each 3    lisinopril (PRINIVIL) 20 MG Tab Take 1 Tablet by mouth every day. 30 Tablet 11    montelukast (SINGULAIR) 10 MG Tab Take 1 Tablet by mouth every day. 30 Tablet 11    LORazepam (ATIVAN) 1 MG Tab       FLUoxetine (PROZAC) 20 MG Cap       zolpidem (AMBIEN CR) 12.5 MG CR tablet       TRUEplus Lancets 33G Misc TRUEplus Lancets 33 gauge       No current facility-administered medications for this visit.     She  has a past medical history of Anxiety, Bipolar 1 disorder (HCC), Depression, Diabetes (HCC), Hyperlipidemia, Hypertension, and Thyroid disease.         Objective:     Vitals:    08/11/22 1039   BP: (!) 140/80   Pulse: (!) 104   Resp: 16   Temp: 36.6 °C (97.9 °F)   SpO2: 93%     Body mass index is 52.3 kg/m².   Physical Exam:  Constitutional: Alert, no distress.  Skin: Warm, dry, good turgor, no rashes in visible areas.  Eye: Equal, round and reactive, conjunctiva clear, lids normal.  Monofilament testing with a 10 gram force: sensation  intact: intact bilaterally  Visual Inspection: Feet without maceration, ulcers, fissures.  Pedal pulses: intact bilaterally      Results and Imaging:   Lab Results   Component Value Date/Time    HBA1C 10.6 (A) 08/11/2022 10:52 AM          Assessment and Plan:   The following treatment plan was discussed    1. Type 2 diabetes mellitus without complication, without long-term current use of insulin (HCC)  Uncontrolled.  We will increase her Ozempic dose.  We also discussed briefly that we might need to start on insulin but she is reluctant to do this.  -Continue Jardiance 25 mg daily  - POCT Hemoglobin A1C  - Semaglutide, 2 MG/DOSE, (OZEMPIC, 2 MG/DOSE,) 8 MG/3ML Solution Pen-injector; Inject 2 mg under the skin every 7 days.  Dispense: 3 mL; Refill: 5  - Comp Metabolic Panel; Future  - Lipid Profile; Future  - Microalbumin Creat Ratio Urine - Lab Collect; Future  - Diabetic Monofilament LE Exam  -Follow-up in 3 months for repeat A1c    2. Lithium use  We will check a lithium level as requested by her psychiatrist.  Psychiatry to continue dose management (Honey at Dr. Mcghee's's office)  - LITHIUM; Future    3. Need for vaccination  - PneumoVax PPV23 =>3yo  - Tdap =>8yo IM    4. Other specified hypothyroidism  Stable, continue current medication  -Levothyroxine 50 mcg daily  - TSH WITH REFLEX TO FT4; Future    5. Encounter for screening mammogram for breast cancer  - MA-SCREENING MAMMO BILAT W/TOMOSYNTHESIS W/CAD; Future    6. Essential hypertension  Blood pressure is slightly elevated however patient suffers from severe anxiety.  We will continue current medications and monitor.    7. Cough  - benzonatate (TESSALON) 100 MG Cap; Take 1 Capsule by mouth 3 times a day as needed for Cough.  Dispense: 60 Capsule; Refill: 3        Followup: Return in about 3 months (around 11/11/2022), or if symptoms worsen or fail to improve, for diabetes.

## 2022-08-19 ENCOUNTER — PATIENT MESSAGE (OUTPATIENT)
Dept: MEDICAL GROUP | Facility: MEDICAL CENTER | Age: 47
End: 2022-08-19
Payer: MEDICAID

## 2022-08-19 DIAGNOSIS — E11.65 TYPE 2 DIABETES MELLITUS WITH HYPERGLYCEMIA, WITHOUT LONG-TERM CURRENT USE OF INSULIN (HCC): ICD-10-CM

## 2022-08-19 RX ORDER — DULAGLUTIDE 3 MG/.5ML
0.5 INJECTION, SOLUTION SUBCUTANEOUS
Qty: 2 ML | Refills: 3 | Status: SHIPPED | OUTPATIENT
Start: 2022-08-19 | End: 2022-12-27 | Stop reason: SDUPTHER

## 2022-08-22 NOTE — TELEPHONE ENCOUNTER
DOCUMENTATION OF PAR STATUS:    1. Name of Medication & Dose: Trulicity 3mg    2. Name of Prescription Coverage Company & phone #: [facility] pharmacy    3. Date Prior Auth Submitted: 8/22/2022    4. What information was given to obtain insurance decision? ICD 10 code    5. Prior Auth Status? Pending    6. Patient Notified: N\A

## 2022-08-31 DIAGNOSIS — R11.0 NAUSEA: ICD-10-CM

## 2022-08-31 RX ORDER — ONDANSETRON 4 MG/1
4 TABLET, ORALLY DISINTEGRATING ORAL EVERY 6 HOURS PRN
Qty: 10 TABLET | Refills: 5 | Status: SHIPPED | OUTPATIENT
Start: 2022-08-31 | End: 2023-02-07 | Stop reason: SDUPTHER

## 2022-09-20 DIAGNOSIS — E03.9 HYPOTHYROIDISM, UNSPECIFIED TYPE: ICD-10-CM

## 2022-09-21 RX ORDER — LEVOTHYROXINE SODIUM 0.05 MG/1
50 TABLET ORAL
Qty: 30 TABLET | Refills: 5 | Status: SHIPPED | OUTPATIENT
Start: 2022-09-21 | End: 2023-03-06 | Stop reason: SDUPTHER

## 2022-10-19 DIAGNOSIS — J30.2 SEASONAL ALLERGIC RHINITIS, UNSPECIFIED TRIGGER: ICD-10-CM

## 2022-10-19 DIAGNOSIS — I10 ESSENTIAL HYPERTENSION: ICD-10-CM

## 2022-10-19 RX ORDER — LORATADINE 10 MG/1
10 TABLET ORAL DAILY
Qty: 30 TABLET | Refills: 5 | Status: SHIPPED | OUTPATIENT
Start: 2022-10-19 | End: 2023-04-05 | Stop reason: SDUPTHER

## 2022-10-19 RX ORDER — MONTELUKAST SODIUM 10 MG/1
10 TABLET ORAL DAILY
Qty: 30 TABLET | Refills: 11 | Status: SHIPPED | OUTPATIENT
Start: 2022-10-19 | End: 2023-10-18 | Stop reason: SDUPTHER

## 2022-10-19 RX ORDER — METOPROLOL TARTRATE 50 MG/1
50 TABLET, FILM COATED ORAL DAILY
Qty: 30 TABLET | Refills: 5 | Status: SHIPPED | OUTPATIENT
Start: 2022-10-19 | End: 2023-04-05 | Stop reason: SDUPTHER

## 2022-11-17 DIAGNOSIS — R05.9 COUGH: ICD-10-CM

## 2022-11-17 DIAGNOSIS — E11.9 TYPE 2 DIABETES MELLITUS WITHOUT COMPLICATION, WITHOUT LONG-TERM CURRENT USE OF INSULIN (HCC): ICD-10-CM

## 2022-11-17 RX ORDER — BENZONATATE 100 MG/1
100 CAPSULE ORAL 3 TIMES DAILY PRN
Qty: 60 CAPSULE | Refills: 3 | Status: SHIPPED | OUTPATIENT
Start: 2022-11-17 | End: 2023-01-17 | Stop reason: SDUPTHER

## 2022-11-17 RX ORDER — ATORVASTATIN CALCIUM 20 MG/1
20 TABLET, FILM COATED ORAL DAILY
Qty: 30 TABLET | Refills: 5 | Status: SHIPPED | OUTPATIENT
Start: 2022-11-17 | End: 2023-06-05 | Stop reason: SDUPTHER

## 2022-11-17 NOTE — TELEPHONE ENCOUNTER
Received request via: Pharmacy    Was the patient seen in the last year in this department? Yes    Does the patient have an active prescription (recently filled or refills available) for medication(s) requested? No    Does the patient have longterm Plus and need 100 day supply (blood pressure, diabetes and cholesterol meds only)? Patient does not have SCP

## 2022-12-27 DIAGNOSIS — E11.65 TYPE 2 DIABETES MELLITUS WITH HYPERGLYCEMIA, WITHOUT LONG-TERM CURRENT USE OF INSULIN (HCC): ICD-10-CM

## 2022-12-27 RX ORDER — DULAGLUTIDE 3 MG/.5ML
0.5 INJECTION, SOLUTION SUBCUTANEOUS
Qty: 2 ML | Refills: 5 | Status: SHIPPED | OUTPATIENT
Start: 2022-12-27 | End: 2022-12-28

## 2022-12-27 NOTE — TELEPHONE ENCOUNTER
Received request via: Patient    Was the patient seen in the last year in this department? Yes    Does the patient have an active prescription (recently filled or refills available) for medication(s) requested? No  Future Appointments         Provider Department [place]    1/5/2023 10:00 AM [facility]     1/20/2023 11:00 AM (Arrive by 10:45 AM) Blaire Guillory M.D. [facility] [place]             PT REQUEST REFILL TO [facility] [address] [facility] TO SHOW CHANGE

## 2022-12-28 ENCOUNTER — TELEPHONE (OUTPATIENT)
Dept: MEDICAL GROUP | Facility: MEDICAL CENTER | Age: 47
End: 2022-12-28
Payer: MEDICAID

## 2022-12-28 DIAGNOSIS — E11.9 TYPE 2 DIABETES MELLITUS WITHOUT COMPLICATION, WITHOUT LONG-TERM CURRENT USE OF INSULIN (HCC): ICD-10-CM

## 2022-12-28 RX ORDER — SEMAGLUTIDE 2.68 MG/ML
2 INJECTION, SOLUTION SUBCUTANEOUS
Qty: 3 ML | Refills: 3 | Status: SHIPPED | OUTPATIENT
Start: 2022-12-28 | End: 2023-01-04 | Stop reason: SDUPTHER

## 2022-12-28 NOTE — TELEPHONE ENCOUNTER
VOICEMAIL  1. Caller Name: Nevin Braxton    Call Back Number: [phone] (home) [phone] (work)      2. Message: Patient mention the pharmacy does not have trulicity on stock, but they do have ozempic on stock patient mention if is possible you can Rx her that instead since she was taking that back then which was 2 mg.    3. Patient approves office to leave a detailed voicemail/MyChart message: yes

## 2023-01-04 DIAGNOSIS — E11.9 TYPE 2 DIABETES MELLITUS WITHOUT COMPLICATION, WITHOUT LONG-TERM CURRENT USE OF INSULIN (HCC): ICD-10-CM

## 2023-01-04 RX ORDER — SEMAGLUTIDE 2.68 MG/ML
2 INJECTION, SOLUTION SUBCUTANEOUS
Qty: 3 ML | Refills: 3 | Status: SHIPPED | OUTPATIENT
Start: 2023-01-04 | End: 2023-07-10 | Stop reason: SDUPTHER

## 2023-01-04 NOTE — TELEPHONE ENCOUNTER
Received request via: Patient    Was the patient seen in the last year in this department? Yes    Does the patient have an active prescription (recently filled or refills available) for medication(s) requested? No    Pt has requested to have medication sent to [facility], [facility]   [facility]

## 2023-01-17 DIAGNOSIS — M62.838 MUSCLE SPASM: ICD-10-CM

## 2023-01-17 DIAGNOSIS — R05.9 COUGH: ICD-10-CM

## 2023-01-18 RX ORDER — BENZONATATE 100 MG/1
100 CAPSULE ORAL 3 TIMES DAILY PRN
Qty: 60 CAPSULE | Refills: 5 | Status: SHIPPED | OUTPATIENT
Start: 2023-01-18 | End: 2023-10-18 | Stop reason: SDUPTHER

## 2023-01-18 RX ORDER — CYCLOBENZAPRINE HCL 5 MG
5 TABLET ORAL NIGHTLY PRN
Qty: 30 TABLET | Refills: 5 | Status: SHIPPED | OUTPATIENT
Start: 2023-01-18 | End: 2023-07-21 | Stop reason: SDUPTHER

## 2023-01-18 NOTE — TELEPHONE ENCOUNTER
Received request via: Pharmacy    Was the patient seen in the last year in this department? Yes    Does the patient have an active prescription (recently filled or refills available) for medication(s) requested? No    Does the patient have intermediate Plus and need 100 day supply (blood pressure, diabetes and cholesterol meds only)? Patient does not have SCP    Future Appointments         Provider Department [place]    1/27/2023 10:00 AM [facility]     1/31/2023 10:20 AM (Arrive by 10:05 AM) Blaire Guillory M.D. [facility]

## 2023-01-18 NOTE — TELEPHONE ENCOUNTER
Received request via: Pharmacy    Was the patient seen in the last year in this department? Yes    Does the patient have an active prescription (recently filled or refills available) for medication(s) requested? No    Does the patient have [facility] Plus and need 100 day supply (blood pressure, diabetes and cholesterol meds only)? Patient does not have SCP    Future Appointments         Provider Department [place]    1/27/2023 10:00 AM [facility]     1/31/2023 10:20 AM (Arrive by 10:05 AM) Blaire Guillory M.D. [facility]

## 2023-02-07 DIAGNOSIS — R11.0 NAUSEA: ICD-10-CM

## 2023-02-07 RX ORDER — ONDANSETRON 4 MG/1
4 TABLET, ORALLY DISINTEGRATING ORAL EVERY 6 HOURS PRN
Qty: 10 TABLET | Refills: 5 | Status: SHIPPED | OUTPATIENT
Start: 2023-02-07 | End: 2023-10-09 | Stop reason: SDUPTHER

## 2023-02-07 NOTE — TELEPHONE ENCOUNTER
Received request via: Pharmacy    Was the patient seen in the last year in this department? Yes    Does the patient have an active prescription (recently filled or refills available) for medication(s) requested? No    Does the patient have alf Plus and need 100 day supply (blood pressure, diabetes and cholesterol meds only)? Patient does not have SCP  Future Appointments         Provider Department [place]    2/28/2023 10:00 AM [facility]     3/15/2023 11:00 AM (Arrive by 10:45 AM) Blaire Guillory M.D. [facility]

## 2023-02-14 ENCOUNTER — TELEPHONE (OUTPATIENT)
Dept: MEDICAL GROUP | Facility: MEDICAL CENTER | Age: 48
End: 2023-02-14
Payer: MEDICAID

## 2023-02-15 NOTE — TELEPHONE ENCOUNTER
MEDICATION PRIOR AUTHORIZATION NEEDED:    1. Name of Medication: ozempic    2. Requested By (Name of [facility]): [facility]      3. Is insurance on file current? yes    4. What is the name & phone number of the 3rd party payor? Haider ENRIQUEZ approved, pharmacy and Patient advised

## 2023-03-06 DIAGNOSIS — E03.9 HYPOTHYROIDISM, UNSPECIFIED TYPE: ICD-10-CM

## 2023-03-07 RX ORDER — LEVOTHYROXINE SODIUM 0.05 MG/1
50 TABLET ORAL
Qty: 30 TABLET | Refills: 0 | Status: SHIPPED | OUTPATIENT
Start: 2023-03-07 | End: 2023-04-05 | Stop reason: SDUPTHER

## 2023-03-07 NOTE — TELEPHONE ENCOUNTER
Received request via: Pharmacy    Was the patient seen in the last year in this department? Yes    Does the patient have an active prescription (recently filled or refills available) for medication(s) requested? No    Does the patient have intermediate Plus and need 100 day supply (blood pressure, diabetes and cholesterol meds only)? N/a

## 2023-04-04 DIAGNOSIS — R19.7 DIARRHEA, UNSPECIFIED TYPE: ICD-10-CM

## 2023-04-04 RX ORDER — LOPERAMIDE HYDROCHLORIDE 2 MG/1
2 CAPSULE ORAL 4 TIMES DAILY PRN
Qty: 30 CAPSULE | Refills: 0 | Status: SHIPPED | OUTPATIENT
Start: 2023-04-04 | End: 2023-07-10 | Stop reason: SDUPTHER

## 2023-04-05 DIAGNOSIS — E11.9 TYPE 2 DIABETES MELLITUS WITHOUT COMPLICATION, WITHOUT LONG-TERM CURRENT USE OF INSULIN (HCC): ICD-10-CM

## 2023-04-05 DIAGNOSIS — E03.9 HYPOTHYROIDISM, UNSPECIFIED TYPE: ICD-10-CM

## 2023-04-05 DIAGNOSIS — J30.2 SEASONAL ALLERGIC RHINITIS, UNSPECIFIED TRIGGER: ICD-10-CM

## 2023-04-05 DIAGNOSIS — I10 ESSENTIAL HYPERTENSION: ICD-10-CM

## 2023-04-05 NOTE — TELEPHONE ENCOUNTER
Received request via: Pharmacy    Was the patient seen in the last year in this department? Yes    Does the patient have an active prescription (recently filled or refills available) for medication(s) requested? No    Does the patient have alf Plus and need 100 day supply (blood pressure, diabetes and cholesterol meds only)? Patient does not have SCP  Future Appointments         Provider Department [place]    4/20/2023 10:00 AM [facility]     5/4/2023 11:40 AM (Arrive by 11:25 AM) Blaire Guillory M.D. [facility]

## 2023-04-05 NOTE — TELEPHONE ENCOUNTER
Received request via: Pharmacy    Was the patient seen in the last year in this department? Yes    Does the patient have an active prescription (recently filled or refills available) for medication(s) requested? No    Does the patient have FDC Plus and need 100 day supply (blood pressure, diabetes and cholesterol meds only)? Patient does not have SCP  Future Appointments         Provider Department [place]    4/20/2023 10:00 AM [facility]     5/4/2023 11:40 AM (Arrive by 11:25 AM) Blaire Guillory M.D. [facility]

## 2023-04-06 RX ORDER — LEVOTHYROXINE SODIUM 0.05 MG/1
50 TABLET ORAL
Qty: 30 TABLET | Refills: 5 | Status: SHIPPED | OUTPATIENT
Start: 2023-04-06 | End: 2023-10-18 | Stop reason: SDUPTHER

## 2023-04-06 RX ORDER — EMPAGLIFLOZIN 25 MG/1
25 TABLET, FILM COATED ORAL DAILY
Qty: 30 TABLET | Refills: 5 | Status: SHIPPED | OUTPATIENT
Start: 2023-04-06 | End: 2023-10-18 | Stop reason: SDUPTHER

## 2023-04-06 RX ORDER — LORATADINE 10 MG/1
10 TABLET ORAL DAILY
Qty: 30 TABLET | Refills: 5 | Status: SHIPPED | OUTPATIENT
Start: 2023-04-06 | End: 2023-10-18 | Stop reason: SDUPTHER

## 2023-04-06 RX ORDER — METOPROLOL TARTRATE 50 MG/1
50 TABLET, FILM COATED ORAL DAILY
Qty: 30 TABLET | Refills: 5 | Status: SHIPPED | OUTPATIENT
Start: 2023-04-06 | End: 2023-10-18 | Stop reason: SDUPTHER

## 2023-06-05 DIAGNOSIS — E11.9 TYPE 2 DIABETES MELLITUS WITHOUT COMPLICATION, WITHOUT LONG-TERM CURRENT USE OF INSULIN (HCC): ICD-10-CM

## 2023-06-06 RX ORDER — ATORVASTATIN CALCIUM 20 MG/1
20 TABLET, FILM COATED ORAL DAILY
Qty: 30 TABLET | Refills: 5 | Status: SHIPPED | OUTPATIENT
Start: 2023-06-06 | End: 2024-01-17 | Stop reason: SDUPTHER

## 2023-07-10 DIAGNOSIS — R19.7 DIARRHEA, UNSPECIFIED TYPE: ICD-10-CM

## 2023-07-10 RX ORDER — LOPERAMIDE HYDROCHLORIDE 2 MG/1
2 CAPSULE ORAL 4 TIMES DAILY PRN
Qty: 30 CAPSULE | Refills: 0 | Status: SHIPPED | OUTPATIENT
Start: 2023-07-10 | End: 2023-10-18 | Stop reason: SDUPTHER

## 2023-07-10 NOTE — TELEPHONE ENCOUNTER
Received request via: Pharmacy    Was the patient seen in the last year in this department? Yes    Does the patient have an active prescription (recently filled or refills available) for medication(s) requested? No    Does the patient have residential Plus and need 100 day supply (blood pressure, diabetes and cholesterol meds only)? Patient does not have SCP  Future Appointments         Provider Department [place]    7/20/2023 11:20 AM (Arrive by 11:05 AM) Keiry Arroyo M.D. [facility]

## 2023-07-21 DIAGNOSIS — E11.9 TYPE 2 DIABETES MELLITUS WITHOUT COMPLICATION, WITHOUT LONG-TERM CURRENT USE OF INSULIN (HCC): ICD-10-CM

## 2023-07-21 DIAGNOSIS — M62.838 MUSCLE SPASM: ICD-10-CM

## 2023-07-21 RX ORDER — SEMAGLUTIDE 2.68 MG/ML
2 INJECTION, SOLUTION SUBCUTANEOUS
Qty: 3 ML | Refills: 2 | Status: SHIPPED | OUTPATIENT
Start: 2023-07-21 | End: 2023-07-31

## 2023-07-21 RX ORDER — CYCLOBENZAPRINE HCL 5 MG
5 TABLET ORAL NIGHTLY PRN
Qty: 30 TABLET | Refills: 5 | Status: SHIPPED | OUTPATIENT
Start: 2023-07-21 | End: 2023-10-18

## 2023-07-21 NOTE — TELEPHONE ENCOUNTER
Received request via: Patient    Was the patient seen in the last year in this department? Yes, next appt 08/22/2023    Does the patient have an active prescription (recently filled or refills available) for medication(s) requested? No    Does the patient have care home Plus and need 100 day supply (blood pressure, diabetes and cholesterol meds only)? Patient does not have SCP

## 2023-07-31 ENCOUNTER — PATIENT MESSAGE (OUTPATIENT)
Dept: MEDICAL GROUP | Facility: MEDICAL CENTER | Age: 48
End: 2023-07-31
Payer: MEDICAID

## 2023-07-31 DIAGNOSIS — E11.9 TYPE 2 DIABETES MELLITUS WITHOUT COMPLICATION, WITHOUT LONG-TERM CURRENT USE OF INSULIN (HCC): ICD-10-CM

## 2023-07-31 RX ORDER — DULAGLUTIDE 3 MG/.5ML
3 INJECTION, SOLUTION SUBCUTANEOUS
Qty: 2 ML | Refills: 6 | Status: SHIPPED | OUTPATIENT
Start: 2023-07-31 | End: 2024-02-16 | Stop reason: SDUPTHER

## 2023-08-07 ENCOUNTER — PATIENT MESSAGE (OUTPATIENT)
Dept: MEDICAL GROUP | Facility: MEDICAL CENTER | Age: 48
End: 2023-08-07
Payer: MEDICAID

## 2023-08-07 DIAGNOSIS — E11.9 TYPE 2 DIABETES MELLITUS WITHOUT COMPLICATION, WITHOUT LONG-TERM CURRENT USE OF INSULIN (HCC): ICD-10-CM

## 2023-10-09 DIAGNOSIS — R11.0 NAUSEA: ICD-10-CM

## 2023-10-10 RX ORDER — ONDANSETRON 4 MG/1
4 TABLET, ORALLY DISINTEGRATING ORAL EVERY 6 HOURS PRN
Qty: 10 TABLET | Refills: 5 | Status: SHIPPED | OUTPATIENT
Start: 2023-10-10

## 2023-10-10 NOTE — TELEPHONE ENCOUNTER
Patient requesting refill, stating she has made an appointment on 10/18/23    Received request via: Patient    Was the patient seen in the last year in this department? No    Does the patient have an active prescription (recently filled or refills available) for medication(s) requested? No    Does the patient have [facility] Plus and need 100 day supply (blood pressure, diabetes and cholesterol meds only)? Patient does not have SCP    Future Appointments         Provider Department [facility]    10/18/2023 10:40 AM (Arrive by 10:25 AM) Blaire Guillory M.D. [facility]    11/7/2023 9:15 AM [facility]     11/15/2023 10:40 AM (Arrive by 10:25 AM) Blaire Guillory M.D. [facility]

## 2023-10-18 ENCOUNTER — TELEMEDICINE (OUTPATIENT)
Dept: MEDICAL GROUP | Facility: MEDICAL CENTER | Age: 48
End: 2023-10-18
Attending: INTERNAL MEDICINE
Payer: MEDICAID

## 2023-10-18 VITALS — HEIGHT: 62 IN | BODY MASS INDEX: 52.63 KG/M2 | RESPIRATION RATE: 16 BRPM | WEIGHT: 286 LBS

## 2023-10-18 DIAGNOSIS — J30.2 SEASONAL ALLERGIC RHINITIS, UNSPECIFIED TRIGGER: ICD-10-CM

## 2023-10-18 DIAGNOSIS — F31.4 BIPOLAR DISORDER, CURRENT EPISODE DEPRESSED, SEVERE, UNSPECIFIED WHETHER PSYCHOTIC FEATURES (HCC): ICD-10-CM

## 2023-10-18 DIAGNOSIS — G25.81 RESTLESS LEGS: ICD-10-CM

## 2023-10-18 DIAGNOSIS — R19.7 DIARRHEA, UNSPECIFIED TYPE: ICD-10-CM

## 2023-10-18 DIAGNOSIS — I10 ESSENTIAL HYPERTENSION: ICD-10-CM

## 2023-10-18 DIAGNOSIS — R05.9 COUGH: ICD-10-CM

## 2023-10-18 DIAGNOSIS — E03.9 HYPOTHYROIDISM, UNSPECIFIED TYPE: ICD-10-CM

## 2023-10-18 DIAGNOSIS — E11.9 TYPE 2 DIABETES MELLITUS WITHOUT COMPLICATION, WITHOUT LONG-TERM CURRENT USE OF INSULIN (HCC): ICD-10-CM

## 2023-10-18 PROBLEM — Z79.899 LITHIUM USE: Status: RESOLVED | Noted: 2022-08-11 | Resolved: 2023-10-18

## 2023-10-18 PROCEDURE — 99214 OFFICE O/P EST MOD 30 MIN: CPT | Performed by: INTERNAL MEDICINE

## 2023-10-18 RX ORDER — OLANZAPINE AND FLUOXETINE 12; 50 MG/1; MG/1
CAPSULE ORAL
COMMUNITY
Start: 2023-09-22

## 2023-10-18 RX ORDER — MONTELUKAST SODIUM 10 MG/1
10 TABLET ORAL DAILY
Qty: 30 TABLET | Refills: 11 | Status: SHIPPED | OUTPATIENT
Start: 2023-10-18

## 2023-10-18 RX ORDER — LOPERAMIDE HYDROCHLORIDE 2 MG/1
2 CAPSULE ORAL 4 TIMES DAILY PRN
Qty: 30 CAPSULE | Refills: 3 | Status: SHIPPED | OUTPATIENT
Start: 2023-10-18

## 2023-10-18 RX ORDER — LORATADINE 10 MG/1
10 TABLET ORAL DAILY
Qty: 30 TABLET | Refills: 5 | Status: SHIPPED | OUTPATIENT
Start: 2023-10-18

## 2023-10-18 RX ORDER — LOSARTAN POTASSIUM 50 MG/1
50 TABLET ORAL DAILY
Qty: 30 TABLET | Refills: 5 | Status: SHIPPED | OUTPATIENT
Start: 2023-10-18

## 2023-10-18 RX ORDER — CYCLOBENZAPRINE HCL 10 MG
10 TABLET ORAL NIGHTLY PRN
Qty: 30 TABLET | Refills: 5 | Status: SHIPPED | OUTPATIENT
Start: 2023-10-18 | End: 2024-02-02 | Stop reason: SDUPTHER

## 2023-10-18 RX ORDER — LEVOTHYROXINE SODIUM 0.05 MG/1
50 TABLET ORAL
Qty: 30 TABLET | Refills: 5 | Status: SHIPPED | OUTPATIENT
Start: 2023-10-18

## 2023-10-18 RX ORDER — BENZONATATE 100 MG/1
100 CAPSULE ORAL 3 TIMES DAILY PRN
Qty: 90 CAPSULE | Refills: 5 | Status: SHIPPED | OUTPATIENT
Start: 2023-10-18

## 2023-10-18 RX ORDER — EMPAGLIFLOZIN 25 MG/1
25 TABLET, FILM COATED ORAL DAILY
Qty: 30 TABLET | Refills: 5 | Status: SHIPPED | OUTPATIENT
Start: 2023-10-18

## 2023-10-18 RX ORDER — METOPROLOL TARTRATE 50 MG/1
50 TABLET, FILM COATED ORAL DAILY
Qty: 30 TABLET | Refills: 5 | Status: SHIPPED | OUTPATIENT
Start: 2023-10-18

## 2023-10-18 NOTE — ASSESSMENT & PLAN NOTE
She reports restless leg symptoms especially at night.  States that her psychiatrist had put her on Risperdal and the symptoms were very severe with this.  They have become less pronounced now that she is off the respite all but still present intermittently.  She has been taking Flexeril 5 mg with mild improvement, notices 10 mg helps more.  Is requesting an increase in the dose.

## 2023-10-18 NOTE — ASSESSMENT & PLAN NOTE
Currently taking lisinopril 20 mg daily however with persistent cough.  Has never been on losartan.

## 2023-10-18 NOTE — PROGRESS NOTES
Virtual Visit: Established Patient   This visit was conducted via Zoom using secure and encrypted videoconferencing technology.   The patient was in their home in the [facility].    The patient's identity was confirmed and verbal consent was obtained for this virtual visit.     Subjective:   CC: medication review, cough, restless leg symptoms    Nevin Braxton is a 48 y.o. female presenting for evaluation and management of:    Cough  She continues to complain of a cough that is worse on some days compared to others.  Reports that her heartburn has been well controlled and she denies significant postnasal drip while on her allergy medications.  She cannot remember if cough coincided at all with her lisinopril.  She has been taking Tessalon whenever she feels the cough coming on because sometimes it can get severe enough that it causes nausea with occasional vomiting.  No defined etiology has been determined.  Visits have been almost completely virtual making this difficult to evaluate.    Type 2 diabetes mellitus (HCC)  She is currently taking Jardiance 25 mg daily and Trulicity 3 mg weekly.  Historically has had very severe diarrhea and GI upset with any dose of metformin.  She does not have an updated A1c, has existing orders for labs but has not been able to get them done due to mental health issues.  Has a lab appointment scheduled for 11/7.    Bipolar disorder, current episode depressed, severe (HCC)  She continues to work closely with her psychiatrist.  Reports that her best friend moved away and this was very hard on her.  She started to become suicidal.  She is now taking olanzapine-fluoxetine 12- 50 mg with some improvement in symptoms.      Other specified hypothyroidism  She continues on levothyroxine 50 mcg daily.  She has outstanding labs.    Restless legs  She reports restless leg symptoms especially at night.  States that her psychiatrist had put her on Risperdal and the symptoms were very  severe with this.  They have become less pronounced now that she is off the respite all but still present intermittently.  She has been taking Flexeril 5 mg with mild improvement, notices 10 mg helps more.  Is requesting an increase in the dose.    Essential hypertension  Currently taking lisinopril 20 mg daily however with persistent cough.  Has never been on losartan.       Current medicines (including changes today)  Current Outpatient Medications   Medication Sig Dispense Refill    benzonatate (TESSALON) 100 MG Cap Take 1 Capsule by mouth 3 times a day as needed for Cough. 90 Capsule 5    losartan (COZAAR) 50 MG Tab Take 1 Tablet by mouth every day. 30 Tablet 5    cyclobenzaprine (FLEXERIL) 10 mg Tab Take 1 Tablet by mouth at bedtime as needed for Muscle Spasms. 30 Tablet 5    montelukast (SINGULAIR) 10 MG Tab Take 1 Tablet by mouth every day. 30 Tablet 11    metoprolol tartrate (LOPRESSOR) 50 MG Tab Take 1 Tablet by mouth every day. 30 Tablet 5    loratadine (CLARITIN) 10 MG Tab Take 1 Tablet by mouth every day. 30 Tablet 5    loperamide (IMODIUM) 2 MG Cap Take 1 Capsule by mouth 4 times a day as needed for Diarrhea. 30 Capsule 3    levothyroxine (SYNTHROID) 50 MCG Tab Take 1 Tablet by mouth every morning on an empty stomach. ON EMPTY STOMACH 30 Tablet 5    Empagliflozin (JARDIANCE) 25 MG Tab Take 25 mg by mouth every day. 30 Tablet 5    Olanzapine-Fluoxetine HCl 12-50 MG Cap       ondansetron (ZOFRAN ODT) 4 MG TABLET DISPERSIBLE Take 1 Tablet by mouth every 6 hours as needed for Nausea/Vomiting. 10 Tablet 5    Dulaglutide (TRULICITY) 3 MG/0.5ML Solution Pen-injector Inject 3 mg under the skin every 7 days. 2.0 ml equals 4 pens per month 2 mL 6    atorvastatin (LIPITOR) 20 MG Tab Take 1 Tablet by mouth every day. 30 Tablet 5    magnesium oxide (MAG-OX) 400 MG Tab tablet magnesium oxide 400 mg (241.3 mg magnesium) tablet      Blood Glucose Meter Kit Use to test blood sugar daily 1 Kit 0    glucose blood strip 1  "Strip by Other route every day. 100 Strip 3    Lancets Lancets order: Lancets for insurance preference meter. Sig: use daily and prn ssx high or low sugar. #100 RF x 3 100 Each 3    LORazepam (ATIVAN) 1 MG Tab       zolpidem (AMBIEN CR) 12.5 MG CR tablet        No current facility-administered medications for this visit.       Patient Active Problem List    Diagnosis Date Noted    Restless legs 10/18/2023    Diarrhea 10/18/2023    Cough 08/11/2022    Muscle spasm 02/23/2022    Moderate nonproliferative diabetic retinopathy (HCC) 05/14/2021    Cataract 05/14/2021    Generalized anxiety disorder with panic attacks 03/26/2021    Agoraphobia 03/26/2021    Other hyperlipidemia 03/26/2021    Insomnia 03/26/2021    Type 2 diabetes mellitus (HCC) 06/05/2019    Bipolar disorder, current episode depressed, severe ([facility]) 06/05/2019    Other specified hypothyroidism 06/05/2019    Allergic rhinitis 06/05/2019    Essential hypertension 06/05/2019        Objective:   Resp 16   Ht 1.575 m (5' 2\")   Wt (!) 130 kg (286 lb)   BMI 52.31 kg/m²     Physical Exam:  Constitutional: Alert, no distress, well-groomed.  Skin: No rashes in visible areas.  Eye: Round. Conjunctiva clear, lids normal. No icterus.   ENMT: Lips pink without lesions, good dentition, moist mucous membranes. Phonation normal.  Neck: No masses, no thyromegaly. Moves freely without pain.  Respiratory: Unlabored respiratory effort, no cough or audible wheeze  Psych: Alert and oriented x3, normal affect and mood.     Assessment and Plan:   The following treatment plan was discussed:     1. Cough  Etiology is unclear.  Discussed that we can continue to treat with Tessalon but typically not a long-term prescription and would like to find the root cause.  Discussed stopping lisinopril and starting losartan to see if this makes a difference.  GERD and postnasal drip seem to be well controlled at this time and less likely cause  -Stop lisinopril, start losartan as below  - " benzonatate (TESSALON) 100 MG Cap; Take 1 Capsule by mouth 3 times a day as needed for Cough.  Dispense: 90 Capsule; Refill: 5    2. Essential hypertension  See discussion above  - losartan (COZAAR) 50 MG Tab; Take 1 Tablet by mouth every day.  Dispense: 30 Tablet; Refill: 5  - metoprolol tartrate (LOPRESSOR) 50 MG Tab; Take 1 Tablet by mouth every day.  Dispense: 30 Tablet; Refill: 5    3. Restless legs  Unclear whether true restless leg syndrome.  Discussed adding iron studies to her labs to rule out iron deficiency as cause.  I have increased her Flexeril dose as this has been helpful.  Question whether side effect of medication as she did improve with discontinuation of Risperdal but is still on an atypical antipsychotic.  - FERRITIN; Future  - IRON/TOTAL IRON BIND; Future  - cyclobenzaprine (FLEXERIL) 10 mg Tab; Take 1 Tablet by mouth at bedtime as needed for Muscle Spasms.  Dispense: 30 Tablet; Refill: 5    4. Seasonal allergic rhinitis, unspecified trigger  - montelukast (SINGULAIR) 10 MG Tab; Take 1 Tablet by mouth every day.  Dispense: 30 Tablet; Refill: 11  - loratadine (CLARITIN) 10 MG Tab; Take 1 Tablet by mouth every day.  Dispense: 30 Tablet; Refill: 5    5. Diarrhea, unspecified type  Now occasional in the setting of being off of metformin, have refilled her Imodium  - loperamide (IMODIUM) 2 MG Cap; Take 1 Capsule by mouth 4 times a day as needed for Diarrhea.  Dispense: 30 Capsule; Refill: 3    6. Hypothyroidism, unspecified type  Encouraged her to obtain updated labs which have been ordered  - levothyroxine (SYNTHROID) 50 MCG Tab; Take 1 Tablet by mouth every morning on an empty stomach. ON EMPTY STOMACH  Dispense: 30 Tablet; Refill: 5    7. Type 2 diabetes mellitus without complication, without long-term current use of insulin ([facility])  Encouraged her to obtain updated labs which have been ordered.  Continue current medication  - Empagliflozin (JARDIANCE) 25 MG Tab; Take 25 mg by mouth every day.   Dispense: 30 Tablet; Refill: 5  -Trulicity 3 mg weekly    8. Bipolar disorder, current episode depressed, severe, unspecified whether psychotic features (HCC)  Not currently actively suicidal and working closely with her psychiatrist.  She will continue current medication management through their office.  - Olanzapine-Fluoxetine HCl 12-50 MG Cap      Follow-up: Return in about 4 weeks (around 11/15/2023) for cough, lab review.

## 2023-10-18 NOTE — ASSESSMENT & PLAN NOTE
She is currently taking Jardiance 25 mg daily and Trulicity 3 mg weekly.  Historically has had very severe diarrhea and GI upset with any dose of metformin.  She does not have an updated A1c, has existing orders for labs but has not been able to get them done due to mental health issues.  Has a lab appointment scheduled for 11/7.

## 2023-10-18 NOTE — ASSESSMENT & PLAN NOTE
She continues to work closely with her psychiatrist.  Reports that her best friend moved away and this was very hard on her.  She started to become suicidal.  She is now taking olanzapine-fluoxetine 12- 50 mg with some improvement in symptoms.

## 2023-11-06 NOTE — ASSESSMENT & PLAN NOTE
She continues to complain of a cough that is worse on some days compared to others.  Reports that her heartburn has been well controlled and she denies significant postnasal drip while on her allergy medications.  She cannot remember if cough coincided at all with her lisinopril.  She has been taking Tessalon whenever she feels the cough coming on because sometimes it can get severe enough that it causes nausea with occasional vomiting.  No defined etiology has been determined.  Visits have been almost completely virtual making this difficult to evaluate.   5

## 2023-11-13 ENCOUNTER — TELEPHONE (OUTPATIENT)
Dept: MEDICAL GROUP | Facility: MEDICAL CENTER | Age: 48
End: 2023-11-13
Payer: MEDICAID

## 2023-11-13 NOTE — TELEPHONE ENCOUNTER
DOCUMENTATION OF PAR STATUS:    1. Name of Medication & Dose: Dulaglutide (TRULICITY) 3 MG/0.5ML Solution Pen-injector     2. Name of Prescription Coverage Company & phone #: [employer].    3. Date Prior Auth Submitted: 11/13/23    4. What information was given to obtain insurance decision? Chart notes,icd-10code, med hx.    5. Prior Auth Status? Pending    6. Patient Notified: N\A

## 2023-11-15 ENCOUNTER — TELEPHONE (OUTPATIENT)
Dept: MEDICAL GROUP | Facility: MEDICAL CENTER | Age: 48
End: 2023-11-15
Payer: MEDICAID

## 2023-11-15 NOTE — TELEPHONE ENCOUNTER
DOCUMENTATION OF PAR STATUS:    1. Name of Medication & Dose:  Dulaglutide (TRULICITY) 3 MG/0.5ML Solution Pen-injector     2. Name of Prescription Coverage Company & phone #: [employer]    3. Date Prior Auth Submitted: 11/15/23    4. What information was given to obtain insurance decision? Chart notes, icd-10 code, med hx.    5. Prior Auth Status? Pending    6. Patient Notified: N\A

## 2024-01-08 ENCOUNTER — TELEMEDICINE (OUTPATIENT)
Dept: MEDICAL GROUP | Facility: MEDICAL CENTER | Age: 49
End: 2024-01-08
Attending: FAMILY MEDICINE
Payer: MEDICAID

## 2024-01-08 DIAGNOSIS — R05.3 CHRONIC COUGH: ICD-10-CM

## 2024-01-08 DIAGNOSIS — R68.89 FLU-LIKE SYMPTOMS: ICD-10-CM

## 2024-01-08 PROCEDURE — 99214 OFFICE O/P EST MOD 30 MIN: CPT | Performed by: FAMILY MEDICINE

## 2024-01-08 RX ORDER — OMEPRAZOLE 20 MG/1
20 TABLET, DELAYED RELEASE ORAL DAILY
Qty: 30 TABLET | Refills: 3 | Status: SHIPPED | OUTPATIENT
Start: 2024-01-08

## 2024-01-08 RX ORDER — OSELTAMIVIR PHOSPHATE 75 MG/1
75 CAPSULE ORAL 2 TIMES DAILY
Qty: 10 CAPSULE | Refills: 0 | Status: SHIPPED | OUTPATIENT
Start: 2024-01-08

## 2024-01-17 DIAGNOSIS — E11.9 TYPE 2 DIABETES MELLITUS WITHOUT COMPLICATION, WITHOUT LONG-TERM CURRENT USE OF INSULIN (HCC): ICD-10-CM

## 2024-01-17 RX ORDER — ATORVASTATIN CALCIUM 20 MG/1
20 TABLET, FILM COATED ORAL DAILY
Qty: 30 TABLET | Refills: 5 | Status: SHIPPED | OUTPATIENT
Start: 2024-01-17

## 2024-01-30 ENCOUNTER — PATIENT MESSAGE (OUTPATIENT)
Dept: MEDICAL GROUP | Facility: MEDICAL CENTER | Age: 49
End: 2024-01-30
Payer: MEDICAID

## 2024-01-30 DIAGNOSIS — G25.81 RESTLESS LEGS: ICD-10-CM

## 2024-02-02 RX ORDER — CYCLOBENZAPRINE HCL 10 MG
10 TABLET ORAL 2 TIMES DAILY PRN
Qty: 60 TABLET | Refills: 5 | Status: SHIPPED | OUTPATIENT
Start: 2024-02-02

## 2024-02-05 ENCOUNTER — DOCUMENTATION (OUTPATIENT)
Dept: MEDICAL GROUP | Facility: MEDICAL CENTER | Age: 49
End: 2024-02-05
Payer: MEDICAID

## 2024-02-05 NOTE — PROGRESS NOTES
[id number]. Prior authorization was done through Cover My Meds for the medication Ozempic 2mg/dose. It was sent to [place] for determination.

## 2024-02-09 ENCOUNTER — APPOINTMENT (OUTPATIENT)
Dept: LAB | Facility: MEDICAL CENTER | Age: 49
End: 2024-02-09
Payer: MEDICAID

## 2024-02-09 ENCOUNTER — DOCUMENTATION (OUTPATIENT)
Dept: MEDICAL GROUP | Facility: MEDICAL CENTER | Age: 49
End: 2024-02-09

## 2024-02-09 NOTE — PROGRESS NOTES
Received fax from [place] stating that the medication Ozempic 2mg/dose has been denied. Fas has been added to chart.

## 2024-02-16 DIAGNOSIS — E11.9 TYPE 2 DIABETES MELLITUS WITHOUT COMPLICATION, WITHOUT LONG-TERM CURRENT USE OF INSULIN (HCC): ICD-10-CM

## 2024-02-16 RX ORDER — DULAGLUTIDE 3 MG/.5ML
3 INJECTION, SOLUTION SUBCUTANEOUS
Qty: 2 ML | Refills: 6 | Status: SHIPPED | OUTPATIENT
Start: 2024-02-16

## 2024-02-16 NOTE — TELEPHONE ENCOUNTER
Pt is attempting to have Trbellaty transferred back to [facility] for home delivery need new Rx.     Received request via: Pharmacy    Was the patient seen in the last year in this department? Yes    Does the patient have an active prescription (recently filled or refills available) for medication(s) requested? No    Pharmacy Name: [facility]     Does the patient have alf Plus and need 100 day supply (blood pressure, diabetes and cholesterol meds only)? Patient does not have SCP

## 2024-04-03 NOTE — PROGRESS NOTES
Soledad - can you call Mrs. Walter?  I can also see her tomorrow or Friday AM. Virtual Visit: Established Patient   This visit was conducted via Zoom using secure and encrypted videoconferencing technology.   The patient was in their home in the [facility].    The patient's identity was confirmed and verbal consent was obtained for this virtual visit.     Subjective:   CC: No chief complaint on file.      Nevin Braxton is a 48 y.o. female presenting for evaluation and management of:    HPI: Patient is a 48 y.o. female complaining of 2 days of illness including: chills, nocturnal cough, fever.   Mucus is: clear.  Similarly ill exposures: no.  Treatments tried: tessalon pereles  She  reports that she has never smoked. She has never used smokeless tobacco..     ROS:  No measured fever,  changes in bowel movements or skin rash.     Current medicines (including changes today)  Current Outpatient Medications   Medication Sig Dispense Refill    omeprazole (PRILOSEC OTC) 20 MG tablet Take 1 Tablet by mouth every day. 30 Tablet 3    oseltamivir (TAMIFLU) 75 MG Cap Take 1 Capsule by mouth 2 times a day. 10 Capsule 0    Olanzapine-Fluoxetine HCl 12-50 MG Cap       benzonatate (TESSALON) 100 MG Cap Take 1 Capsule by mouth 3 times a day as needed for Cough. 90 Capsule 5    losartan (COZAAR) 50 MG Tab Take 1 Tablet by mouth every day. 30 Tablet 5    cyclobenzaprine (FLEXERIL) 10 mg Tab Take 1 Tablet by mouth at bedtime as needed for Muscle Spasms. 30 Tablet 5    montelukast (SINGULAIR) 10 MG Tab Take 1 Tablet by mouth every day. 30 Tablet 11    metoprolol tartrate (LOPRESSOR) 50 MG Tab Take 1 Tablet by mouth every day. 30 Tablet 5    loratadine (CLARITIN) 10 MG Tab Take 1 Tablet by mouth every day. 30 Tablet 5    loperamide (IMODIUM) 2 MG Cap Take 1 Capsule by mouth 4 times a day as needed for Diarrhea. 30 Capsule 3    levothyroxine (SYNTHROID) 50 MCG Tab Take 1 Tablet by mouth every morning on an empty stomach. ON EMPTY STOMACH 30 Tablet 5    Empagliflozin (JARDIANCE) 25 MG Tab Take 25 mg by mouth  every day. 30 Tablet 5    ondansetron (ZOFRAN ODT) 4 MG TABLET DISPERSIBLE Take 1 Tablet by mouth every 6 hours as needed for Nausea/Vomiting. 10 Tablet 5    Dulaglutide (TRULICITY) 3 MG/0.5ML Solution Pen-injector Inject 3 mg under the skin every 7 days. 2.0 ml equals 4 pens per month 2 mL 6    atorvastatin (LIPITOR) 20 MG Tab Take 1 Tablet by mouth every day. 30 Tablet 5    magnesium oxide (MAG-OX) 400 MG Tab tablet magnesium oxide 400 mg (241.3 mg magnesium) tablet      Blood Glucose Meter Kit Use to test blood sugar daily 1 Kit 0    glucose blood strip 1 Strip by Other route every day. 100 Strip 3    Lancets Lancets order: Lancets for insurance preference meter. Sig: use daily and prn ssx high or low sugar. #100 RF x 3 100 Each 3    LORazepam (ATIVAN) 1 MG Tab       zolpidem (AMBIEN CR) 12.5 MG CR tablet        No current facility-administered medications for this visit.       Patient Active Problem List    Diagnosis Date Noted    Flu-like symptoms 01/08/2024    Restless legs 10/18/2023    Diarrhea 10/18/2023    Cough 08/11/2022    Muscle spasm 02/23/2022    Moderate nonproliferative diabetic retinopathy (HCC) 05/14/2021    Cataract 05/14/2021    Generalized anxiety disorder with panic attacks 03/26/2021    Agoraphobia 03/26/2021    Other hyperlipidemia 03/26/2021    Insomnia 03/26/2021    Type 2 diabetes mellitus (HCC) 06/05/2019    Bipolar disorder, current episode depressed, severe (HCC) 06/05/2019    Other specified hypothyroidism 06/05/2019    Allergic rhinitis 06/05/2019    Essential hypertension 06/05/2019        Objective:   There were no vitals taken for this visit.    Physical Exam:  Constitutional: Alert, no distress, well-groomed.  Skin: No rashes in visible areas.  Eye: Round. Conjunctiva clear, lids normal. No icterus.   ENMT: Lips pink without lesions, good dentition, moist mucous membranes. Phonation normal.  Neck: No masses, no thyromegaly. Moves freely without pain.  Respiratory: Unlabored  respiratory effort, no cough or audible wheeze  Psych: Alert and oriented x3, normal affect and mood.     Assessment and Plan:   The following treatment plan was discussed:     1. Flu-like symptoms   Acute issue. No respiratory distress, evidence of moderate to severe range dehydration, or worrisome vital signs. Suspect most likely   viral etiology for symptoms secondary to history and physical examination.  Given timing we will send empiric treatment for flu with Tamiflu.  Reviewed risk and benefits of medication.  Patient agreeable.  - oseltamivir (TAMIFLU) 75 MG Cap; Take 1 Capsule by mouth 2 times a day.  Dispense: 10 Capsule; Refill: 0  - Recommend symptomatic treatment with  - Tylenol or Motrin q6-8 hrs, may alternate q4 hours  - Mucinex for congestion  - Discussed adequate rest, proper hygiene and respiratory precautions, and hydration with water and electrolyte drink.   - Pt. to follow up for worsening symptoms in 3-5 days or persistence in 2 weeks  - Clinic vs. ER for respiratory distress or inability to hydrate per our discussion     2. Chronic cough  Long discussion with patient about etiology and treatment.   Likely viral in etiology with post nasal drip.  Advised pt to use mucinex and sinus rinse BID for next 3-4 weeks. Continue supportive treatment. Tessalon as needed for cough. Given chronicity will also send for empiric course of PPI for therapeutic and diagnostic purposes.   Patient understands and verbalizes agreement.  - omeprazole (PRILOSEC OTC) 20 MG tablet; Take 1 Tablet by mouth every day.  Dispense: 30 Tablet; Refill: 3    Follow-up: Return if symptoms worsen or fail to improve.

## 2024-04-17 ENCOUNTER — TELEMEDICINE (OUTPATIENT)
Dept: MEDICAL GROUP | Facility: MEDICAL CENTER | Age: 49
End: 2024-04-17
Attending: INTERNAL MEDICINE
Payer: MEDICAID

## 2024-04-17 ENCOUNTER — APPOINTMENT (OUTPATIENT)
Dept: MEDICAL GROUP | Facility: MEDICAL CENTER | Age: 49
End: 2024-04-17
Payer: MEDICAID

## 2024-04-17 VITALS — BODY MASS INDEX: 52.63 KG/M2 | HEIGHT: 62 IN | RESPIRATION RATE: 16 BRPM | WEIGHT: 286 LBS

## 2024-04-17 DIAGNOSIS — F40.00 AGORAPHOBIA: ICD-10-CM

## 2024-04-17 DIAGNOSIS — E11.9 TYPE 2 DIABETES MELLITUS WITHOUT COMPLICATION, WITHOUT LONG-TERM CURRENT USE OF INSULIN (HCC): ICD-10-CM

## 2024-04-17 PROBLEM — R68.89 FLU-LIKE SYMPTOMS: Status: RESOLVED | Noted: 2024-01-08 | Resolved: 2024-04-17

## 2024-04-17 PROCEDURE — 99214 OFFICE O/P EST MOD 30 MIN: CPT | Performed by: INTERNAL MEDICINE

## 2024-04-17 NOTE — PROGRESS NOTES
Virtual Visit: Established Patient   This visit was conducted via Zoom using secure and encrypted videoconferencing technology.   The patient was in their home in the [facility].    The patient's identity was confirmed and verbal consent was obtained for this virtual visit.     Subjective:   CC: RTC paperwork, discuss trulicity    Nevin Braxton is a 48 y.o. female presenting for evaluation and management of:    Type 2 diabetes mellitus (HCC)  She has been having some difficulty getting her GLP-1 agonist consistently (whether Trulicity or Ozempic).  Currently has Trulicity 3 mg weekly and has an additional 2-week supply but we have had to bounce back and forth between the 2 medications depending on availability.  Overall she has not had significant missed doses.  Additionally is taking Jardiance 25 mg daily.  Has not had labs in quite some time but is scheduled for blood work on 5/3.    Agoraphobia  Is requesting completion of her RTC paperwork given her agoraphobia, severe anxiety, and bipolar disorder.  Has had RTC in the past.  Paperwork was completed and faxed to previously however she states that they never received it and is requesting it to be resent.  She continues to follow closely with her psychiatrist who is managing her medication.       Current medicines (including changes today)  Current Outpatient Medications   Medication Sig Dispense Refill    Dulaglutide (TRULICITY) 3 MG/0.5ML Solution Pen-injector Inject 3 mg under the skin every 7 days. 2.0 ml equals 4 pens per month 2 mL 6    cyclobenzaprine (FLEXERIL) 10 mg Tab Take 1 Tablet by mouth 2 times a day as needed for Muscle Spasms. 60 Tablet 5    atorvastatin (LIPITOR) 20 MG Tab Take 1 Tablet by mouth every day. 30 Tablet 5    omeprazole (PRILOSEC OTC) 20 MG tablet Take 1 Tablet by mouth every day. 30 Tablet 3    Olanzapine-Fluoxetine HCl 12-50 MG Cap       benzonatate (TESSALON) 100 MG Cap Take 1 Capsule by mouth 3 times a day as needed for  Cough. 90 Capsule 5    losartan (COZAAR) 50 MG Tab Take 1 Tablet by mouth every day. 30 Tablet 5    montelukast (SINGULAIR) 10 MG Tab Take 1 Tablet by mouth every day. 30 Tablet 11    metoprolol tartrate (LOPRESSOR) 50 MG Tab Take 1 Tablet by mouth every day. 30 Tablet 5    loratadine (CLARITIN) 10 MG Tab Take 1 Tablet by mouth every day. 30 Tablet 5    loperamide (IMODIUM) 2 MG Cap Take 1 Capsule by mouth 4 times a day as needed for Diarrhea. 30 Capsule 3    levothyroxine (SYNTHROID) 50 MCG Tab Take 1 Tablet by mouth every morning on an empty stomach. ON EMPTY STOMACH 30 Tablet 5    Empagliflozin (JARDIANCE) 25 MG Tab Take 25 mg by mouth every day. 30 Tablet 5    ondansetron (ZOFRAN ODT) 4 MG TABLET DISPERSIBLE Take 1 Tablet by mouth every 6 hours as needed for Nausea/Vomiting. 10 Tablet 5    magnesium oxide (MAG-OX) 400 MG Tab tablet magnesium oxide 400 mg (241.3 mg magnesium) tablet      Blood Glucose Meter Kit Use to test blood sugar daily 1 Kit 0    glucose blood strip 1 Strip by Other route every day. 100 Strip 3    Lancets Lancets order: Lancets for insurance preference meter. Sig: use daily and prn ssx high or low sugar. #100 RF x 3 100 Each 3    LORazepam (ATIVAN) 1 MG Tab       zolpidem (AMBIEN CR) 12.5 MG CR tablet        No current facility-administered medications for this visit.       Patient Active Problem List    Diagnosis Date Noted    Restless legs 10/18/2023    Diarrhea 10/18/2023    Cough 08/11/2022    Muscle spasm 02/23/2022    Moderate nonproliferative diabetic retinopathy (HCC) 05/14/2021    Cataract 05/14/2021    Generalized anxiety disorder with panic attacks 03/26/2021    Agoraphobia 03/26/2021    Other hyperlipidemia 03/26/2021    Insomnia 03/26/2021    Type 2 diabetes mellitus (HCC) 06/05/2019    Bipolar disorder, current episode depressed, severe (HCC) 06/05/2019    Other specified hypothyroidism 06/05/2019    Allergic rhinitis 06/05/2019    Essential hypertension 06/05/2019         "Objective:   Resp 16   Ht 1.575 m (5' 2.01\")   Wt (!) 130 kg (286 lb)   BMI 52.30 kg/m²     Physical Exam  Constitutional: Alert, no distress, well-groomed.  Skin: No rashes in visible areas.  Eye: Round. Conjunctiva clear, lids normal. No icterus.   ENMT: Lips pink without lesions, good dentition, moist mucous membranes. Phonation normal.  Neck: No masses, no thyromegaly. Moves freely without pain.  Respiratory: Unlabored respiratory effort, no cough or audible wheeze  Psych: Alert and oriented x3, normal affect and mood.     Assessment and Plan:   The following treatment plan was discussed:     1. Type 2 diabetes mellitus without complication, without long-term current use of insulin (HCC)  Unknown degree of control given lack of labs.  We will follow-up in more detail at her next appointment on 5/7.  Discussed that we may need to be flexible with our GLP-1 agonist given nationwide shortage but for now she will continue Trulicity  -Trulicity 3 mg weekly  -jardiance 25 mg daily    2. Agoraphobia, severe anxiety, bipolar disorder  I have completed her RTC paperwork as her mental health diagnoses prevent her from taking public transportation.  We have refaxed this again today and she will continue follow-up with her psychiatrist for medication management      Follow-up: Return in about 4 weeks (around 5/15/2024).           "

## 2024-04-17 NOTE — ASSESSMENT & PLAN NOTE
Is requesting completion of her RTC paperwork given her agoraphobia, severe anxiety, and bipolar disorder.  Has had RTC in the past.  Paperwork was completed and faxed to previously however she states that they never received it and is requesting it to be resent.  She continues to follow closely with her psychiatrist who is managing her medication.

## 2024-04-17 NOTE — ASSESSMENT & PLAN NOTE
She has been having some difficulty getting her GLP-1 agonist consistently (whether Trulicity or Ozempic).  Currently has Trulicity 3 mg weekly and has an additional 2-week supply but we have had to bounce back and forth between the 2 medications depending on availability.  Overall she has not had significant missed doses.  Additionally is taking Jardiance 25 mg daily.  Has not had labs in quite some time but is scheduled for blood work on 5/3.

## 2024-04-17 NOTE — LETTER
April 17, 2024        Nevin Braxton  [address]  [id number]  [facility] [address]        Dear Nevin:    Here is a copy of the singed [facility] paperwork. It has been faxed to [phone]    If you have any questions or concerns, please don't hesitate to call.        Sincerely,        Blaire Guillory M.D.    Electronically Signed

## 2024-04-29 DIAGNOSIS — R05.3 CHRONIC COUGH: ICD-10-CM

## 2024-04-29 NOTE — TELEPHONE ENCOUNTER
Received request via: Patient    Was the patient seen in the last year in this department? Yes    Does the patient have an active prescription (recently filled or refills available) for medication(s) requested? No    [facility]    Does the patient have shelter Plus and need 100 day supply (blood pressure, diabetes and cholesterol meds only)? Patient does not have [facility]    Future Appointments         Provider [facility]    5/14/2024 10:00 AM [facility] [place]    5/23/2024 11:20 AM (Arrive by 11:05 AM) Blaire Guillory M.D. [facility]

## 2024-04-30 RX ORDER — OMEPRAZOLE 20 MG/1
20 TABLET, DELAYED RELEASE ORAL DAILY
Qty: 30 TABLET | Refills: 5 | Status: SHIPPED | OUTPATIENT
Start: 2024-04-30

## 2024-05-08 DIAGNOSIS — I10 ESSENTIAL HYPERTENSION: ICD-10-CM

## 2024-05-08 RX ORDER — METOPROLOL TARTRATE 50 MG/1
50 TABLET, FILM COATED ORAL DAILY
Qty: 30 TABLET | Refills: 5 | Status: SHIPPED | OUTPATIENT
Start: 2024-05-08

## 2024-05-08 NOTE — TELEPHONE ENCOUNTER
Received request via: Pharmacy    Was the patient seen in the last year in this department? Yes    Does the patient have an active prescription (recently filled or refills available) for medication(s) requested? No    [facility]     Does the patient have [facility] Plus and need 100 day supply (blood pressure, diabetes and cholesterol meds only)? Patient does not have [facility]

## 2024-06-11 DIAGNOSIS — R05.9 COUGH: ICD-10-CM

## 2024-06-11 NOTE — TELEPHONE ENCOUNTER
Received request via: Patient    Was the patient seen in the last year in this department? Yes    Does the patient have an active prescription (recently filled or refills available) for medication(s) requested? No    [facility]    Does the patient have [facility]PC Plus and need 100 day supply (blood pressure, diabetes and cholesterol meds only)? Patient does not have [facility]    Future Appointments         Provider [facility]    7/3/2024 10:00 AM [facility]    7/11/2024 1:40 PM (Arrive by 1:25 PM) Blaire Guillory M.D. [facility]

## 2024-06-12 RX ORDER — BENZONATATE 100 MG/1
100 CAPSULE ORAL 3 TIMES DAILY PRN
Qty: 90 CAPSULE | Refills: 5 | Status: SHIPPED | OUTPATIENT
Start: 2024-06-12

## 2024-06-25 DIAGNOSIS — I10 ESSENTIAL HYPERTENSION: ICD-10-CM

## 2024-06-25 RX ORDER — LOSARTAN POTASSIUM 50 MG/1
50 TABLET ORAL DAILY
Qty: 30 TABLET | Refills: 5 | Status: SHIPPED | OUTPATIENT
Start: 2024-06-25

## 2024-06-25 NOTE — TELEPHONE ENCOUNTER
Received request via: Pharmacy    Was the patient seen in the last year in this department? Yes    Does the patient have an active prescription (recently filled or refills available) for medication(s) requested? No    [facility]    Does the patient have [facility] Plus and need 100 day supply (blood pressure, diabetes and cholesterol meds only)? Patient does not have [facility]

## 2024-07-22 DIAGNOSIS — G25.81 RESTLESS LEGS: ICD-10-CM

## 2024-07-23 RX ORDER — CYCLOBENZAPRINE HCL 10 MG
10 TABLET ORAL 2 TIMES DAILY PRN
Qty: 60 TABLET | Refills: 5 | Status: SHIPPED | OUTPATIENT
Start: 2024-07-23

## 2024-08-04 ENCOUNTER — PATIENT MESSAGE (OUTPATIENT)
Dept: MEDICAL GROUP | Facility: MEDICAL CENTER | Age: 49
End: 2024-08-04
Payer: MEDICAID

## 2024-08-04 DIAGNOSIS — E11.9 TYPE 2 DIABETES MELLITUS WITHOUT COMPLICATION, WITHOUT LONG-TERM CURRENT USE OF INSULIN (HCC): ICD-10-CM

## 2024-08-08 DIAGNOSIS — R19.7 DIARRHEA, UNSPECIFIED TYPE: ICD-10-CM

## 2024-08-08 RX ORDER — LOPERAMIDE HCL 2 MG
2 CAPSULE ORAL 4 TIMES DAILY PRN
Qty: 30 CAPSULE | Refills: 3 | Status: SHIPPED | OUTPATIENT
Start: 2024-08-08

## 2024-08-08 NOTE — TELEPHONE ENCOUNTER
Received request via: Patient    Was the patient seen in the last year in this department? Yes    Does the patient have an active prescription (recently filled or refills available) for medication(s) requested? No    [facility]    Does the patient have USP Plus and need 100-day supply? (This applies to ALL medications) Patient does not have [facility]    Future Appointments         Provider Department [place]    8/16/2024 10:00 AM [facility]    8/22/2024 2:20 PM (Arrive by 2:05 PM) Blaire Guillory M.D. [facility]

## 2024-08-20 DIAGNOSIS — E11.9 TYPE 2 DIABETES MELLITUS WITHOUT COMPLICATION, WITHOUT LONG-TERM CURRENT USE OF INSULIN (HCC): ICD-10-CM

## 2024-08-20 RX ORDER — ATORVASTATIN CALCIUM 20 MG/1
20 TABLET, FILM COATED ORAL DAILY
Qty: 30 TABLET | Refills: 5 | Status: SHIPPED | OUTPATIENT
Start: 2024-08-20

## 2024-08-20 NOTE — TELEPHONE ENCOUNTER
Received request via: Patient    Was the patient seen in the last year in this department? Yes    Does the patient have an active prescription (recently filled or refills available) for medication(s) requested? No    [facility]    Does the patient have group home Plus and need 100-day supply? (This applies to ALL medications) Patient does not have [facility]    Future Appointments         Provider [facility]    9/11/2024 10:00 AM [facility]    9/18/2024 10:20 AM (Arrive by 10:05 AM) Blaire Guillory M.D. [facility]

## 2024-09-18 ENCOUNTER — APPOINTMENT (OUTPATIENT)
Dept: LAB | Facility: MEDICAL CENTER | Age: 49
End: 2024-09-18
Payer: MEDICAID

## 2024-10-14 DIAGNOSIS — E11.9 TYPE 2 DIABETES MELLITUS WITHOUT COMPLICATION, WITHOUT LONG-TERM CURRENT USE OF INSULIN (HCC): ICD-10-CM

## 2024-10-16 RX ORDER — EMPAGLIFLOZIN 25 MG/1
25 TABLET, FILM COATED ORAL DAILY
Qty: 30 TABLET | Refills: 5 | Status: SHIPPED | OUTPATIENT
Start: 2024-10-16

## 2024-11-01 DIAGNOSIS — J30.2 SEASONAL ALLERGIC RHINITIS, UNSPECIFIED TRIGGER: ICD-10-CM

## 2024-11-01 DIAGNOSIS — E11.9 TYPE 2 DIABETES MELLITUS WITHOUT COMPLICATION, WITHOUT LONG-TERM CURRENT USE OF INSULIN (HCC): ICD-10-CM

## 2024-11-01 RX ORDER — ATORVASTATIN CALCIUM 20 MG/1
20 TABLET, FILM COATED ORAL DAILY
Qty: 30 TABLET | Refills: 5 | Status: SHIPPED | OUTPATIENT
Start: 2024-11-01

## 2024-11-01 RX ORDER — MONTELUKAST SODIUM 10 MG/1
10 TABLET ORAL DAILY
Qty: 30 TABLET | Refills: 11 | Status: SHIPPED | OUTPATIENT
Start: 2024-11-01

## 2024-11-01 NOTE — TELEPHONE ENCOUNTER
Received request via: Patient    Was the patient seen in the last year in this department? Yes    Does the patient have an active prescription (recently filled or refills available) for medication(s) requested? No    [facility]    Does the patient have jail Plus and need 100-day supply? (This applies to ALL medications) Patient does not have [facility]    Future Appointments         Provider Department [place]    11/19/2024 10:00 AM [facility] [place]    12/11/2024 11:40 AM (Arrive by 11:25 AM) Blaire Guillory M.D. [facility]

## 2024-11-01 NOTE — TELEPHONE ENCOUNTER
Received request via: Pharmacy    Was the patient seen in the last year in this department? Yes    Does the patient have an active prescription (recently filled or refills available) for medication(s) requested? No    [facility]    Does the patient have half-way Plus and need 100-day supply? (This applies to ALL medications) Patient does not have [facility]

## 2024-11-14 DIAGNOSIS — E11.9 TYPE 2 DIABETES MELLITUS WITHOUT COMPLICATION, WITHOUT LONG-TERM CURRENT USE OF INSULIN (HCC): ICD-10-CM

## 2024-12-18 ENCOUNTER — TELEPHONE (OUTPATIENT)
Dept: HEALTH INFORMATION MANAGEMENT | Facility: OTHER | Age: 49
End: 2024-12-18
Payer: MEDICAID

## 2024-12-27 DIAGNOSIS — E11.9 TYPE 2 DIABETES MELLITUS WITHOUT COMPLICATION, WITHOUT LONG-TERM CURRENT USE OF INSULIN (HCC): ICD-10-CM

## 2024-12-27 RX ORDER — DULAGLUTIDE 3 MG/.5ML
0.5 INJECTION, SOLUTION SUBCUTANEOUS
Qty: 2 ML | Refills: 0 | Status: SHIPPED | OUTPATIENT
Start: 2024-12-27

## 2024-12-27 NOTE — TELEPHONE ENCOUNTER
Received request via: Pharmacy    Was the patient seen in the last year in this department? Yes    Does the patient have an active prescription (recently filled or refills available) for medication(s) requested? No    [facility]    Does the patient have assisted Plus and need 100-day supply? (This applies to ALL medications) Patient does not have [facility]    Future Appointments         Provider Department [place]    1/15/2025 10:00 AM [facility] [place]    1/22/2025 10:20 AM (Arrive by 10:05 AM) Blaire Guillory M.D. [facility]

## 2025-02-07 DIAGNOSIS — R05.9 COUGH: ICD-10-CM

## 2025-02-07 DIAGNOSIS — G25.81 RESTLESS LEGS: ICD-10-CM

## 2025-02-07 RX ORDER — BENZONATATE 100 MG/1
100 CAPSULE ORAL 3 TIMES DAILY PRN
Qty: 90 CAPSULE | Refills: 1 | Status: SHIPPED | OUTPATIENT
Start: 2025-02-07

## 2025-02-07 RX ORDER — CYCLOBENZAPRINE HCL 10 MG
10 TABLET ORAL 2 TIMES DAILY PRN
Qty: 60 TABLET | Refills: 1 | Status: SHIPPED | OUTPATIENT
Start: 2025-02-07

## 2025-02-07 NOTE — TELEPHONE ENCOUNTER
Received request via: Patient    Was the patient seen in the last year in this department? Yes    Does the patient have an active prescription (recently filled or refills available) for medication(s) requested? No    [facility]    Does the patient have custodial Plus and need 100-day supply? (This applies to ALL medications) Patient does not have [facility]    Future Appointments         Provider Department [place]    2/11/2025 12:00 PM [facility]    2/20/2025 10:00 AM (Arrive by 9:45 AM) Blaire Guillory M.D. [facility] [place]

## 2025-02-08 NOTE — TELEPHONE ENCOUNTER
Received request via: Patient    Was the patient seen in the last year in this department? Yes    Does the patient have an active prescription (recently filled or refills available) for medication(s) requested? No    [facility]    Does the patient have halfway Plus and need 100-day supply? (This applies to ALL medications) Patient does not have [facility]    Future Appointments         Provider Department [place]    2/11/2025 12:00 PM [facility]    2/20/2025 10:00 AM (Arrive by 9:45 AM) Blaire Guillory M.D. [facility]

## 2025-03-04 ENCOUNTER — APPOINTMENT (OUTPATIENT)
Dept: LAB | Facility: MEDICAL CENTER | Age: 50
End: 2025-03-04
Payer: MEDICAID

## 2025-03-10 ENCOUNTER — HOSPITAL ENCOUNTER (OUTPATIENT)
Dept: LAB | Facility: MEDICAL CENTER | Age: 50
End: 2025-03-10
Attending: INTERNAL MEDICINE
Payer: MEDICAID

## 2025-03-10 DIAGNOSIS — E11.9 TYPE 2 DIABETES MELLITUS WITHOUT COMPLICATION, WITHOUT LONG-TERM CURRENT USE OF INSULIN (HCC): ICD-10-CM

## 2025-03-10 LAB
ALBUMIN SERPL BCP-MCNC: 3.9 G/DL (ref 3.2–4.9)
ALBUMIN/GLOB SERPL: 1.1 G/DL
ALP SERPL-CCNC: 91 U/L (ref 30–99)
ALT SERPL-CCNC: 18 U/L (ref 2–50)
ANION GAP SERPL CALC-SCNC: 14 MMOL/L (ref 7–16)
AST SERPL-CCNC: 20 U/L (ref 12–45)
BILIRUB SERPL-MCNC: 0.7 MG/DL (ref 0.1–1.5)
BUN SERPL-MCNC: 20 MG/DL (ref 8–22)
CALCIUM ALBUM COR SERPL-MCNC: 8.9 MG/DL (ref 8.5–10.5)
CALCIUM SERPL-MCNC: 8.8 MG/DL (ref 8.5–10.5)
CHLORIDE SERPL-SCNC: 99 MMOL/L (ref 96–112)
CHOLEST SERPL-MCNC: 171 MG/DL (ref 100–199)
CO2 SERPL-SCNC: 21 MMOL/L (ref 20–33)
CREAT SERPL-MCNC: 0.83 MG/DL (ref 0.5–1.4)
CREAT UR-MCNC: 35.5 MG/DL
EST. AVERAGE GLUCOSE BLD GHB EST-MCNC: 401 MG/DL
FASTING STATUS PATIENT QL REPORTED: NORMAL
GFR SERPLBLD CREATININE-BSD FMLA CKD-EPI: 86 ML/MIN/1.73 M 2
GLOBULIN SER CALC-MCNC: 3.6 G/DL (ref 1.9–3.5)
GLUCOSE SERPL-MCNC: 349 MG/DL (ref 65–99)
HBA1C MFR BLD: 15.6 % (ref 4–5.6)
HDLC SERPL-MCNC: 52 MG/DL
LDLC SERPL CALC-MCNC: 70 MG/DL
MICROALBUMIN UR-MCNC: 4.6 MG/DL
MICROALBUMIN/CREAT UR: 130 MG/G (ref 0–30)
POTASSIUM SERPL-SCNC: 5 MMOL/L (ref 3.6–5.5)
PROT SERPL-MCNC: 7.5 G/DL (ref 6–8.2)
SODIUM SERPL-SCNC: 134 MMOL/L (ref 135–145)
TRIGL SERPL-MCNC: 244 MG/DL (ref 0–149)

## 2025-03-10 PROCEDURE — 83036 HEMOGLOBIN GLYCOSYLATED A1C: CPT

## 2025-03-10 PROCEDURE — 82043 UR ALBUMIN QUANTITATIVE: CPT

## 2025-03-10 PROCEDURE — 80061 LIPID PANEL: CPT

## 2025-03-10 PROCEDURE — 80053 COMPREHEN METABOLIC PANEL: CPT

## 2025-03-10 PROCEDURE — 82570 ASSAY OF URINE CREATININE: CPT

## 2025-03-10 PROCEDURE — 36415 COLL VENOUS BLD VENIPUNCTURE: CPT

## 2025-03-17 DIAGNOSIS — E11.9 TYPE 2 DIABETES MELLITUS WITHOUT COMPLICATION, WITHOUT LONG-TERM CURRENT USE OF INSULIN (HCC): ICD-10-CM

## 2025-03-17 NOTE — TELEPHONE ENCOUNTER
Received request via: Pharmacy    Was the patient seen in the last year in this department? Yes    Does the patient have an active prescription (recently filled or refills available) for medication(s) requested? No    Pharmacy Name: [facility] [address]    Does the patient have residential Plus and need 100-day supply? (This applies to ALL medications) Patient does not have [facility]

## 2025-03-18 ENCOUNTER — RESULTS FOLLOW-UP (OUTPATIENT)
Dept: MEDICAL GROUP | Facility: MEDICAL CENTER | Age: 50
End: 2025-03-18

## 2025-03-18 RX ORDER — ATORVASTATIN CALCIUM 20 MG/1
20 TABLET, FILM COATED ORAL DAILY
Qty: 30 TABLET | Refills: 11 | Status: SHIPPED | OUTPATIENT
Start: 2025-03-18

## 2025-03-30 DIAGNOSIS — E11.9 TYPE 2 DIABETES MELLITUS WITHOUT COMPLICATION, WITHOUT LONG-TERM CURRENT USE OF INSULIN (HCC): ICD-10-CM

## 2025-03-31 NOTE — TELEPHONE ENCOUNTER
Received request via: Pharmacy    Was the patient seen in the last year in this department? Yes last see 04/2024, appt 05/01/2025    Does the patient have an active prescription (recently filled or refills available) for medication(s) requested? No    Pharmacy Name: [facility] [address]    Does the patient have half-way Plus and need 100-day supply? (This applies to ALL medications) Patient does not have [facility]

## 2025-04-01 RX ORDER — DULAGLUTIDE 3 MG/.5ML
0.5 INJECTION, SOLUTION SUBCUTANEOUS
Qty: 2 ML | Refills: 0 | Status: SHIPPED | OUTPATIENT
Start: 2025-04-01 | End: 2025-04-02

## 2025-04-02 ENCOUNTER — TELEPHONE (OUTPATIENT)
Dept: MEDICAL GROUP | Facility: MEDICAL CENTER | Age: 50
End: 2025-04-02
Payer: MEDICAID

## 2025-04-02 NOTE — TELEPHONE ENCOUNTER
DOCUMENTATION OF PAR STATUS:    1. Name of Medication & Dose:    Dulaglutide (TRULICITY) 3 MG/0.5ML Solution Auto-injecto     2. Name of Prescription Coverage Company & phone #: [employer] medicaid    3. Date Prior Auth Submitted: 4/2/25    4. What information was given to obtain insurance decision? Chart notes,icd-10 code, med hx.    5. Prior Auth Status? Pending    6. Patient Notified: yes

## 2025-04-02 NOTE — TELEPHONE ENCOUNTER
FINAL PRIOR AUTHORIZATION STATUS:    1.  Name of Medication & Dose:  Dulaglutide (TRULICITY) 3 MG/0.5ML Solution Auto-injector     2. Prior Auth Status: Denied.  Reason: scanned under .    3. Action Taken: Pharmacy Notified: N\A Patient Notified: yes

## 2025-04-03 DIAGNOSIS — J30.2 SEASONAL ALLERGIC RHINITIS, UNSPECIFIED TRIGGER: ICD-10-CM

## 2025-04-03 DIAGNOSIS — E03.9 HYPOTHYROIDISM, UNSPECIFIED TYPE: ICD-10-CM

## 2025-04-03 NOTE — TELEPHONE ENCOUNTER
"We let patient know that I have sent a prescription for Ozempic which I think brian is preferring right now.  I read the rejection and they said that \"they did not see what the medication was being used for\" and also that she might be to GLP-1 agonists which she is not.  Makes me wonder whether the PA was completed correctly.  Her A1c is very high so she should qualify.  Will you look into whether the Ozempic needs a PA?  Thanks Nelida!    Blaire Guillory M.D.    "

## 2025-04-04 ENCOUNTER — TELEPHONE (OUTPATIENT)
Dept: MEDICAL GROUP | Facility: MEDICAL CENTER | Age: 50
End: 2025-04-04
Payer: MEDICAID

## 2025-04-04 RX ORDER — LEVOTHYROXINE SODIUM 50 UG/1
50 TABLET ORAL
Qty: 30 TABLET | Refills: 5 | Status: SHIPPED | OUTPATIENT
Start: 2025-04-04

## 2025-04-04 RX ORDER — LORATADINE 10 MG/1
10 TABLET ORAL DAILY
Qty: 30 TABLET | Refills: 5 | Status: SHIPPED | OUTPATIENT
Start: 2025-04-04

## 2025-04-04 NOTE — TELEPHONE ENCOUNTER
Received request via: Patient    Was the patient seen in the last year in this department? Yes    Does the patient have an active prescription (recently filled or refills available) for medication(s) requested? No    [facility]    Does the patient have custodial Plus and need 100-day supply? (This applies to ALL medications) Patient does not have [facility]

## 2025-04-04 NOTE — TELEPHONE ENCOUNTER
Received request via: Pharmacy    Was the patient seen in the last year in this department? Yes    Does the patient have an active prescription (recently filled or refills available) for medication(s) requested? No    [facility]    Does the patient have jail Plus and need 100-day supply? (This applies to ALL medications) Patient does not have [facility]

## 2025-04-04 NOTE — TELEPHONE ENCOUNTER
FINAL PRIOR AUTHORIZATION STATUS:    1.  Name of Medication & Dose:     2. Prior Auth Status: Approved through       3. Action Taken: Pharmacy Notified: N\A Patient Notified: yes

## 2025-04-22 ENCOUNTER — TELEPHONE (OUTPATIENT)
Dept: MEDICAL GROUP | Facility: MEDICAL CENTER | Age: 50
End: 2025-04-22
Payer: MEDICAID

## 2025-04-22 NOTE — TELEPHONE ENCOUNTER
DOCUMENTATION OF PAR STATUS:    1. Name of Medication & Dose: Ozempic     2. Name of Prescription Coverage Company & phone #: [place]    3. Date Prior Auth Submitted: 4/22/2025    4. What information was given to obtain insurance decision? Chart notes    5. Prior Auth Status? Pending    6. Patient Notified: yes

## 2025-04-22 NOTE — TELEPHONE ENCOUNTER
DOCUMENTATION OF PAR STATUS:    1. Name of Medication & Dose: Jardianace     2. Name of Prescription Coverage Company & phone #: [place]    3. Date Prior Auth Submitted: 4/22/2025    4. What information was given to obtain insurance decision? Chart notes    5. Prior Auth Status? Pending    6. Patient Notified: yes

## 2025-04-23 ENCOUNTER — TELEPHONE (OUTPATIENT)
Dept: MEDICAL GROUP | Facility: MEDICAL CENTER | Age: 50
End: 2025-04-23
Payer: MEDICAID

## 2025-04-23 NOTE — TELEPHONE ENCOUNTER
FINAL PRIOR AUTHORIZATION STATUS:    1.  Name of Medication & Dose: OZempic     2. Prior Auth Status: Denied.  Reason: N/A outcome medication was already filled at the pharmacy    3. Action Taken: Pharmacy Notified: N\A Patient Notified: yes

## 2025-04-23 NOTE — TELEPHONE ENCOUNTER
FINAL PRIOR AUTHORIZATION STATUS:    1.  Name of Medication & Dose: Jardiance     2. Prior Auth Status: Denied.  Reason: See letter in media. Letter forwarded to provider    3. Action Taken: Pharmacy Notified: N\A Patient Notified: yes

## 2025-04-24 DIAGNOSIS — E11.65 TYPE 2 DIABETES MELLITUS WITH HYPERGLYCEMIA, WITHOUT LONG-TERM CURRENT USE OF INSULIN (HCC): ICD-10-CM

## 2025-04-24 RX ORDER — DAPAGLIFLOZIN 10 MG/1
10 TABLET, FILM COATED ORAL DAILY
Qty: 100 TABLET | Refills: 3 | Status: SHIPPED | OUTPATIENT
Start: 2025-04-24 | End: 2026-05-29

## 2025-04-24 NOTE — PROGRESS NOTES
Received notification from brian of Jardiance denial, preferred medication now Farxiga.  I have switched her to that, prescription sent to her pharmacy and patient notified via Tere Guillory M.D.

## 2025-04-25 ENCOUNTER — OFFICE VISIT (OUTPATIENT)
Dept: MEDICAL GROUP | Facility: MEDICAL CENTER | Age: 50
End: 2025-04-25
Attending: INTERNAL MEDICINE
Payer: MEDICAID

## 2025-04-25 VITALS
TEMPERATURE: 97.2 F | RESPIRATION RATE: 16 BRPM | BODY MASS INDEX: 51.82 KG/M2 | HEIGHT: 62 IN | DIASTOLIC BLOOD PRESSURE: 76 MMHG | SYSTOLIC BLOOD PRESSURE: 124 MMHG | HEART RATE: 117 BPM | OXYGEN SATURATION: 99 % | WEIGHT: 281.6 LBS

## 2025-04-25 DIAGNOSIS — E03.8 OTHER SPECIFIED HYPOTHYROIDISM: ICD-10-CM

## 2025-04-25 DIAGNOSIS — Z12.11 SCREEN FOR COLON CANCER: ICD-10-CM

## 2025-04-25 DIAGNOSIS — Z59.41 FOOD INSECURITY: ICD-10-CM

## 2025-04-25 DIAGNOSIS — Z23 NEED FOR VACCINATION: ICD-10-CM

## 2025-04-25 DIAGNOSIS — G25.81 RESTLESS LEGS: ICD-10-CM

## 2025-04-25 DIAGNOSIS — E11.65 TYPE 2 DIABETES MELLITUS WITH HYPERGLYCEMIA, UNSPECIFIED WHETHER LONG TERM INSULIN USE (HCC): ICD-10-CM

## 2025-04-25 PROCEDURE — 3078F DIAST BP <80 MM HG: CPT | Performed by: INTERNAL MEDICINE

## 2025-04-25 PROCEDURE — 90471 IMMUNIZATION ADMIN: CPT

## 2025-04-25 PROCEDURE — 99213 OFFICE O/P EST LOW 20 MIN: CPT | Performed by: INTERNAL MEDICINE

## 2025-04-25 PROCEDURE — 99214 OFFICE O/P EST MOD 30 MIN: CPT | Performed by: INTERNAL MEDICINE

## 2025-04-25 PROCEDURE — 3074F SYST BP LT 130 MM HG: CPT | Performed by: INTERNAL MEDICINE

## 2025-04-25 RX ORDER — INSULIN GLARGINE 100 [IU]/ML
15 INJECTION, SOLUTION SUBCUTANEOUS EVERY EVENING
Qty: 15 ML | Refills: 5 | Status: SHIPPED | OUTPATIENT
Start: 2025-04-25

## 2025-04-25 RX ORDER — PEN NEEDLE, DIABETIC 30 GX3/16"
1 NEEDLE, DISPOSABLE MISCELLANEOUS
Qty: 100 EACH | Refills: 3 | Status: SHIPPED | OUTPATIENT
Start: 2025-04-25

## 2025-04-25 RX ORDER — PRAMIPEXOLE DIHYDROCHLORIDE 1.5 MG/1
TABLET ORAL
COMMUNITY
Start: 2025-04-14

## 2025-04-25 SDOH — ECONOMIC STABILITY - FOOD INSECURITY: FOOD INSECURITY: Z59.41

## 2025-04-29 NOTE — ASSESSMENT & PLAN NOTE
Still taking her levothyroxine 50 mcg daily however labs were not checked with her last blood draw.

## 2025-04-29 NOTE — ASSESSMENT & PLAN NOTE
She reports inconsistency getting food.  States that often times she cannot afford healthy diabetic food.  Does not get good options from food pantries.

## 2025-04-29 NOTE — PROGRESS NOTES
Subjective:   Nevin Braxton is a 49 y.o. female here today for multiple concerns    Type 2 diabetes mellitus with hyperglycemia (HCC)  She presents for follow-up on diabetes.  She reports that her medication use has been very inconsistent and she has mostly been off of medication for at least the past few months.  In the past, she has not tolerated metformin due to GI side effects.  She is planning to get back on her Farxiga and Ozempic.  She is also interested in starting insulin if she has a family member who takes this and does well.  A1c on most recent labs is 15.6.    Other specified hypothyroidism  Still taking her levothyroxine 50 mcg daily however labs were not checked with her last blood draw.    Food insecurity  She reports inconsistency getting food.  States that often times she cannot afford healthy diabetic food.  Does not get good options from food pantries.    Restless legs  She reports this has been worse lately.  Her psychiatrist recently prescribed her pramipexole.  She thinks it got worse when she started one of her new psych medications.  She does not feel like the medication is that helpful so she is going to work with her psychiatrist to change it however in the meantime she is very symptomatic.  The pramipexole does help.  We discussed checking iron to make sure this is not contributing.       Current medicines (including changes today)  Current Outpatient Medications   Medication Sig Dispense Refill    pramipexole (MIRAPEX) 1.5 MG tablet       insulin glargine (LANTUS SOLOSTAR) 100 UNIT/ML Solution Pen-injector injection Inject 15 Units under the skin every evening. 15 mL 5    Insulin Pen Needle (PEN NEEDLES) 32G X 4 MM Misc 1 Units at bedtime. 100 Each 3    FARXIGA 10 MG Tab Take 1 Tablet by mouth every day. 100 Tablet 3    levothyroxine (SYNTHROID) 50 MCG Tab Take 1 Tablet by mouth every morning on an empty stomach. ON EMPTY STOMACH 30 Tablet 5    loratadine (CLARITIN) 10 MG Tab Take 1  Tablet by mouth every day. 30 Tablet 5    Semaglutide, 1 MG/DOSE, 4 MG/3ML Solution Pen-injector Inject 1 mg under the skin every 7 days. 3 mL 1    atorvastatin (LIPITOR) 20 MG Tab Take 1 Tablet by mouth every day. 30 Tablet 11    cyclobenzaprine (FLEXERIL) 10 mg Tab Take 1 Tablet by mouth 2 times a day as needed for Muscle Spasms. 60 Tablet 1    benzonatate (TESSALON) 100 MG Cap Take 1 Capsule by mouth 3 times a day as needed for Cough. 90 Capsule 1    montelukast (SINGULAIR) 10 MG Tab Take 1 Tablet by mouth every day. 30 Tablet 11    loperamide (IMODIUM) 2 MG Cap Take 1 Capsule by mouth 4 times a day as needed for Diarrhea. 30 Capsule 3    losartan (COZAAR) 50 MG Tab Take 1 Tablet by mouth every day. 30 Tablet 5    metoprolol tartrate (LOPRESSOR) 50 MG Tab Take 1 Tablet by mouth every day. 30 Tablet 5    omeprazole (PRILOSEC OTC) 20 MG tablet Take 1 Tablet by mouth every day. 30 Tablet 5    Olanzapine-Fluoxetine HCl 12-50 MG Cap       ondansetron (ZOFRAN ODT) 4 MG TABLET DISPERSIBLE Take 1 Tablet by mouth every 6 hours as needed for Nausea/Vomiting. 10 Tablet 5    magnesium oxide (MAG-OX) 400 MG Tab tablet magnesium oxide 400 mg (241.3 mg magnesium) tablet      Blood Glucose Meter Kit Use to test blood sugar daily 1 Kit 0    glucose blood strip 1 Strip by Other route every day. 100 Strip 3    Lancets Lancets order: Lancets for insurance preference meter. Sig: use daily and prn ssx high or low sugar. #100 RF x 3 100 Each 3    LORazepam (ATIVAN) 1 MG Tab       zolpidem (AMBIEN CR) 12.5 MG CR tablet        No current facility-administered medications for this visit.     She  has a past medical history of Anxiety, Bipolar 1 disorder (HCC), Depression, Diabetes (HCC), Hyperlipidemia, Hypertension, and Thyroid disease.         Objective:     Vitals:    04/25/25 1358   BP: 124/76   Pulse: (!) 117   Resp: 16   Temp: 36.2 °C (97.2 °F)   SpO2: 99%     Body mass index is 51.51 kg/m².   Physical Exam:  Constitutional: Alert,  no distress.  Skin: Warm, dry, good turgor, no rashes in visible areas.  Eye: Equal, round and reactive, conjunctiva clear, lids normal.  Respiratory: Unlabored respiratory effort, lungs clear to auscultation, no wheezes, no ronchi.  Cardiovascular: Regular rate and rhythm, no murmurs appreciated, no lower extremity edema  Abdomen: Soft, obese, non-tender  Psych: Alert and oriented x3, anxious      Results and Imaging:   Hospital Outpatient Visit on 03/10/2025   Component Date Value Ref Range Status    Creatinine, Urine 03/10/2025 35.50  mg/dL Final    Microalbumin, Urine Random 03/10/2025 4.6  mg/dL Final    Micro Alb Creat Ratio 03/10/2025 130 (H)  0 - 30 mg/g Final    Sodium 03/10/2025 134 (L)  135 - 145 mmol/L Final    Potassium 03/10/2025 5.0  3.6 - 5.5 mmol/L Final    Chloride 03/10/2025 99  96 - 112 mmol/L Final    Co2 03/10/2025 21  20 - 33 mmol/L Final    Anion Gap 03/10/2025 14.0  7.0 - 16.0 Final    Glucose 03/10/2025 349 (H)  65 - 99 mg/dL Final    Bun 03/10/2025 20  8 - 22 mg/dL Final    Creatinine 03/10/2025 0.83  0.50 - 1.40 mg/dL Final    Calcium 03/10/2025 8.8  8.5 - 10.5 mg/dL Final    Correct Calcium 03/10/2025 8.9  8.5 - 10.5 mg/dL Final    AST(SGOT) 03/10/2025 20  12 - 45 U/L Final    ALT(SGPT) 03/10/2025 18  2 - 50 U/L Final    Alkaline Phosphatase 03/10/2025 91  30 - 99 U/L Final    Total Bilirubin 03/10/2025 0.7  0.1 - 1.5 mg/dL Final    Albumin 03/10/2025 3.9  3.2 - 4.9 g/dL Final    Total Protein 03/10/2025 7.5  6.0 - 8.2 g/dL Final    Globulin 03/10/2025 3.6 (H)  1.9 - 3.5 g/dL Final    A-G Ratio 03/10/2025 1.1  g/dL Final    Glycohemoglobin 03/10/2025 15.6 (H)  4.0 - 5.6 % Final    Comment: Increased risk for diabetes:  5.7 -6.4%  Diabetes:  >6.4%  Glycemic control for adults with diabetes:  <7.0%    The above interpretations are per ADA guidelines.  Diagnosis  of diabetes mellitus on the basis of elevated Hemoglobin A1c  should be confirmed by repeating the Hb A1c test.      Est Avg  Glucose 03/10/2025 401  mg/dL Final    Comment: The eAG calculation is based on the A1c-Derived Daily Glucose  (ADAG) study.  See the ADA's website for additional information.      Cholesterol,Tot 03/10/2025 171  100 - 199 mg/dL Final    Triglycerides 03/10/2025 244 (H)  0 - 149 mg/dL Final    HDL 03/10/2025 52  >=40 mg/dL Final    LDL 03/10/2025 70  <100 mg/dL Final    Fasting Status 03/10/2025 Fasting   Final    GFR (CKD-EPI) 03/10/2025 86  >60 mL/min/1.73 m 2 Final    Comment: Estimated Glomerular Filtration Rate is calculated using  race neutral CKD-EPI 2021 equation per NKF-ASN recommendations.           Assessment and Plan:   The following treatment plan was discussed    1. Food insecurity  Food pantry prescription mailed to patient, encouraged her to try to access the [facility] for carb controlled food resources as I do think this is contributing to her difficulty controlling her blood sugars    2. Other specified hypothyroidism  Continue current medication  -Levothyroxine 50 mcg daily  - TSH WITH REFLEX TO FT4; Future    3. Screen for colon cancer  - OCCULT BLOOD FECES IMMUNOASSAY (FIT); Future    4. Type 2 diabetes mellitus with hyperglycemia, unspecified whether long term insulin use (HCC)  Uncontrolled.  She has mostly been off of medication.  She is willing to restart her Farxiga and Ozempic.  We also discussed starting some long-acting insulin for her.  We will follow-up in 2 weeks.  She will monitor her morning fasting blood sugars.  Will titrate insulin accordingly  - insulin glargine (LANTUS SOLOSTAR) 100 UNIT/ML Solution Pen-injector injection; Inject 15 Units under the skin every evening.  Dispense: 15 mL; Refill: 5  - Insulin Pen Needle (PEN NEEDLES) 32G X 4 MM Misc; 1 Units at bedtime.  Dispense: 100 Each; Refill: 3  - Ozempic 1 mg weekly  - Farxiga 10 mg daily    5. Need for vaccination  - Pneumococcal Conjugate Vaccine 20-Valent (6 wks+)    6. Restless legs  I suspect this is a side  effect of one of her medications as it got significantly worse with a change in one of her psychiatric meds.  Encouraged her to follow-up with her psychiatrist to discuss this as it is less beneficial to treat a side effect with another medication especially since she does not feel that the first was effective.  We will also check a ferritin to make sure she is not significantly iron deficient and that this is not contributing follow-up with psychiatry as planned,  -Potentially discontinue offending medication  - FERRITIN; Future        Followup: Return in about 2 weeks (around 5/9/2025).

## 2025-04-29 NOTE — ASSESSMENT & PLAN NOTE
She reports this has been worse lately.  Her psychiatrist recently prescribed her pramipexole.  She thinks it got worse when she started one of her new psych medications.  She does not feel like the medication is that helpful so she is going to work with her psychiatrist to change it however in the meantime she is very symptomatic.  The pramipexole does help.  We discussed checking iron to make sure this is not contributing.

## 2025-05-05 ENCOUNTER — DOCUMENTATION (OUTPATIENT)
Dept: HEALTH INFORMATION MANAGEMENT | Facility: OTHER | Age: 50
End: 2025-05-05
Payer: MEDICAID

## 2025-05-05 NOTE — PROGRESS NOTES
05/05/2025 (late entry)  Pt was referred to [employer] case management on 05/02/2025. Intake accepted by Yola Luna.

## 2025-05-06 ENCOUNTER — PATIENT OUTREACH (OUTPATIENT)
Dept: HEALTH INFORMATION MANAGEMENT | Facility: OTHER | Age: 50
End: 2025-05-06
Payer: MEDICAID

## 2025-05-06 DIAGNOSIS — R19.7 DIARRHEA, UNSPECIFIED TYPE: ICD-10-CM

## 2025-05-06 DIAGNOSIS — R05.9 COUGH: ICD-10-CM

## 2025-05-06 NOTE — PROGRESS NOTES
5/6/25- Chw received referral from [facility] nurse, Delia Osman who mentions patient could use food assistance. Chw reached out to the patient to offer resource assistance. The patient states she lives along. She is currently on section 8 housing and transportation is very hard for her to get to a nearby food bank. Pt mentions some support from her father who provides her with 100.00 monthly. Pt also mentions she has some brothers, but most of her nearby family lives out of state. Pt mentions she could benefit from some food or delivery service to get her to the end of the month, as her food stamps are not always enough. Chw informed the patient he would look into getting her set up with some food delivered today. Chw also informed the patient this would not be a re-occurring delivery, but something to help her get by.     Chw scheduled to contact patient and set up food delivery for later today between 1-3.   @ 2:00 pm this chw delivered a food box to the patient at her residence and also informed the patient this could not be a weekly service, but that this chw would try to help her access food every other week and or set up other arrangements. The patient was very thankful for this and agreed to ongoing services. Chw to follow up with the patient next week.     5/13/25- Chw followed up with patient to inform her that fellow co-worker recently referred the patient to [employer] Case management services (Te[facility]). The  has been trying to reach the patient but has not been successful. This chw provided Nevin with the contact info for [facility] and encouraged her to reach out for more resources and supportive services through her insurance. The patient thanked me for the call and an referral to case management.     5/29/25- @ 11:30 Chw and fellow chw Tessa met with patient at her home to delivery a [facility] box as the patient recently mentioned funds are tight and she is awaiting financial assistance  from her father and brothers, all whom live out of state. The patient was very thankful for the box of food as she has been relying on [facility] delivery service to help her with food needs. The patient was also given community care management contact info and encouraged reach out to this chw with any ongoing care management needs. The patient thanked both chw's from bringing her food.

## 2025-05-07 RX ORDER — LOPERAMIDE HYDROCHLORIDE 2 MG/1
2 CAPSULE ORAL 4 TIMES DAILY PRN
Qty: 30 CAPSULE | Refills: 3 | Status: SHIPPED | OUTPATIENT
Start: 2025-05-07

## 2025-05-07 RX ORDER — BENZONATATE 100 MG/1
100 CAPSULE ORAL 3 TIMES DAILY PRN
Qty: 90 CAPSULE | Refills: 3 | Status: SHIPPED | OUTPATIENT
Start: 2025-05-07

## 2025-05-07 NOTE — TELEPHONE ENCOUNTER
Received request via: Pharmacy    Was the patient seen in the last year in this department? Yes    Does the patient have an active prescription (recently filled or refills available) for medication(s) requested? No    Pharmacy Name: [facility] [address]    Does the patient have USP Plus and need 100-day supply? (This applies to ALL medications) Patient does not have SCP

## 2025-05-07 NOTE — TELEPHONE ENCOUNTER
Received request via: Pharmacy    Was the patient seen in the last year in this department? Yes    Does the patient have an active prescription (recently filled or refills available) for medication(s) requested? No    Pharmacy Name: [facility] [address]    Does the patient have shelter Plus and need 100-day supply? (This applies to ALL medications) Patient does not have [facility]

## 2025-05-14 DIAGNOSIS — I10 ESSENTIAL HYPERTENSION: ICD-10-CM

## 2025-05-14 DIAGNOSIS — R05.3 CHRONIC COUGH: ICD-10-CM

## 2025-05-14 RX ORDER — METOPROLOL TARTRATE 50 MG
50 TABLET ORAL DAILY
Qty: 30 TABLET | Refills: 5 | Status: SHIPPED | OUTPATIENT
Start: 2025-05-14

## 2025-05-14 RX ORDER — OMEPRAZOLE 20 MG/1
20 TABLET, DELAYED RELEASE ORAL DAILY
Qty: 30 TABLET | Refills: 5 | Status: SHIPPED | OUTPATIENT
Start: 2025-05-14

## 2025-05-14 NOTE — TELEPHONE ENCOUNTER
Received request via: Patient    Was the patient seen in the last year in this department? Yes    Does the patient have an active prescription (recently filled or refills available) for medication(s) requested? No    Pharmacy Name: [facility]    Does the patient have custodial Plus and need 100-day supply? (This applies to ALL medications) Patient does not have SCP    Future Appointments         Provider Department [place]    5/22/2025 12:00 PM [facility]    5/30/2025 2:20 PM (Arrive by 2:05 PM) Blaire Guillory M.D. [facility]

## 2025-05-14 NOTE — TELEPHONE ENCOUNTER
Received request via: Patient    Was the patient seen in the last year in this department? Yes    Does the patient have an active prescription (recently filled or refills available) for medication(s) requested? No    Pharmacy Name: [facility]     Does the patient have [facility] Plus and need 100-day supply? (This applies to ALL medications) Patient does not have SCP    Future Appointments         Provider Department [place]    5/22/2025 12:00 PM [facility]    5/30/2025 2:20 PM (Arrive by 2:05 PM) Blaire Guillory M.D. [facility]

## 2025-05-22 ENCOUNTER — APPOINTMENT (OUTPATIENT)
Dept: LAB | Facility: MEDICAL CENTER | Age: 50
End: 2025-05-22
Payer: MEDICAID

## 2025-05-23 NOTE — TELEPHONE ENCOUNTER
Received request via: Patient    Was the patient seen in the last year in this department? Yes    Does the patient have an active prescription (recently filled or refills available) for medication(s) requested? No    Pharmacy Name: [facility]    Does the patient have long term Plus and need 100-day supply? (This applies to ALL medications) Patient does not have SCP    Future Appointments         Provider Department [place]    5/30/2025 11:00 AM [facility]    6/12/2025 11:40 AM (Arrive by 11:25 AM) Blaire Guillory M.D. [facility]

## 2025-05-30 ENCOUNTER — APPOINTMENT (OUTPATIENT)
Dept: LAB | Facility: MEDICAL CENTER | Age: 50
End: 2025-05-30
Payer: MEDICAID

## 2025-06-06 ENCOUNTER — APPOINTMENT (OUTPATIENT)
Dept: LAB | Facility: MEDICAL CENTER | Age: 50
End: 2025-06-06
Payer: MEDICAID

## 2025-06-12 ENCOUNTER — APPOINTMENT (OUTPATIENT)
Dept: LAB | Facility: MEDICAL CENTER | Age: 50
End: 2025-06-12
Payer: MEDICAID

## 2025-06-12 ENCOUNTER — APPOINTMENT (OUTPATIENT)
Dept: MEDICAL GROUP | Facility: MEDICAL CENTER | Age: 50
End: 2025-06-12
Payer: MEDICAID

## 2025-06-20 ENCOUNTER — APPOINTMENT (OUTPATIENT)
Dept: LAB | Facility: MEDICAL CENTER | Age: 50
End: 2025-06-20
Payer: MEDICAID

## 2025-06-24 ENCOUNTER — APPOINTMENT (OUTPATIENT)
Dept: LAB | Facility: MEDICAL CENTER | Age: 50
End: 2025-06-24
Payer: MEDICAID

## 2025-06-26 ENCOUNTER — APPOINTMENT (OUTPATIENT)
Dept: MEDICAL GROUP | Facility: MEDICAL CENTER | Age: 50
End: 2025-06-26
Payer: MEDICAID

## 2025-07-03 ENCOUNTER — PATIENT OUTREACH (OUTPATIENT)
Dept: HEALTH INFORMATION MANAGEMENT | Facility: OTHER | Age: 50
End: 2025-07-03
Payer: MEDICAID

## 2025-07-03 NOTE — PROGRESS NOTES
HOMAR Correa received request from HOMAR lGez to drop off food box at pts home. CHW dropped off food box by front door. Pt did not answer the door.

## 2025-07-14 ENCOUNTER — APPOINTMENT (OUTPATIENT)
Dept: LAB | Facility: MEDICAL CENTER | Age: 50
End: 2025-07-14
Payer: MEDICAID

## 2025-07-17 ENCOUNTER — APPOINTMENT (OUTPATIENT)
Dept: MEDICAL GROUP | Facility: MEDICAL CENTER | Age: 50
End: 2025-07-17
Payer: MEDICAID

## 2025-07-22 ENCOUNTER — APPOINTMENT (OUTPATIENT)
Dept: LAB | Facility: MEDICAL CENTER | Age: 50
End: 2025-07-22
Payer: MEDICAID

## 2025-07-23 ENCOUNTER — PATIENT OUTREACH (OUTPATIENT)
Dept: HEALTH INFORMATION MANAGEMENT | Facility: OTHER | Age: 50
End: 2025-07-23
Payer: MEDICAID

## 2025-07-23 NOTE — PROGRESS NOTES
Community Health Worker Follow-Up    Reason for outreach: CHW was aske by HOMAR Glez to assist the pt with a food delivery    CHW Interventions: HOMAR Glez asked If another CHW could assist the pt with a food delivery. This CHW volunteered. And took the pt a food delivery from the pantry.    Specific Resources Provided:  Housing/Shelter: n/a  Transportation: n/a  Food: Box of panty food  Financial: n/a  Social Supports: n/a  Other: n/a    Plan: CHW set the box of food on the doorstep. As this CHW was leaving, The pt o[pened thew door and thanked this CHW. CHW will not follow at this time.

## 2025-08-01 ENCOUNTER — APPOINTMENT (OUTPATIENT)
Dept: MEDICAL GROUP | Facility: MEDICAL CENTER | Age: 50
End: 2025-08-01
Payer: MEDICAID

## 2025-08-07 ENCOUNTER — APPOINTMENT (OUTPATIENT)
Dept: MEDICAL GROUP | Facility: MEDICAL CENTER | Age: 50
End: 2025-08-07
Payer: MEDICAID

## 2025-08-14 ENCOUNTER — APPOINTMENT (OUTPATIENT)
Dept: MEDICAL GROUP | Facility: MEDICAL CENTER | Age: 50
End: 2025-08-14
Payer: MEDICAID

## 2025-08-27 DIAGNOSIS — I10 ESSENTIAL HYPERTENSION: ICD-10-CM

## 2025-08-27 RX ORDER — LOSARTAN POTASSIUM 50 MG/1
50 TABLET ORAL DAILY
Qty: 30 TABLET | Refills: 5 | Status: SHIPPED | OUTPATIENT
Start: 2025-08-27

## 2025-08-28 ENCOUNTER — APPOINTMENT (OUTPATIENT)
Dept: MEDICAL GROUP | Facility: MEDICAL CENTER | Age: 50
End: 2025-08-28
Payer: MEDICAID

## 2025-09-10 ENCOUNTER — APPOINTMENT (OUTPATIENT)
Dept: LAB | Facility: MEDICAL CENTER | Age: 50
End: 2025-09-10
Payer: MEDICAID

## 2025-09-25 ENCOUNTER — APPOINTMENT (OUTPATIENT)
Dept: MEDICAL GROUP | Facility: MEDICAL CENTER | Age: 50
End: 2025-09-25
Payer: MEDICAID